# Patient Record
Sex: MALE | Race: WHITE | NOT HISPANIC OR LATINO | Employment: FULL TIME | URBAN - METROPOLITAN AREA
[De-identification: names, ages, dates, MRNs, and addresses within clinical notes are randomized per-mention and may not be internally consistent; named-entity substitution may affect disease eponyms.]

---

## 2022-01-03 PROCEDURE — U0004 COV-19 TEST NON-CDC HGH THRU: HCPCS | Performed by: PHYSICIAN ASSISTANT

## 2022-01-06 ENCOUNTER — TELEPHONE (OUTPATIENT)
Dept: URGENT CARE | Facility: CLINIC | Age: 57
End: 2022-01-06

## 2022-01-06 NOTE — TELEPHONE ENCOUNTER
Discussed positive COVID results with patient and/or parent/guardian. Advised to quarantine according to CDC guidelines and to seek further care if symptoms are worsening. Patient vocalized understanding and agreement with this plan.

## 2022-03-25 VITALS
DIASTOLIC BLOOD PRESSURE: 67 MMHG | HEIGHT: 73 IN | BODY MASS INDEX: 21.65 KG/M2 | WEIGHT: 163.36 LBS | TEMPERATURE: 97.6 F | SYSTOLIC BLOOD PRESSURE: 119 MMHG | RESPIRATION RATE: 18 BRPM | OXYGEN SATURATION: 100 % | HEART RATE: 74 BPM

## 2022-03-25 LAB
ALBUMIN SERPL-MCNC: 4.2 G/DL (ref 3.5–5.2)
ALBUMIN/GLOB SERPL: 1.8 G/DL
ALP SERPL-CCNC: 61 U/L (ref 39–117)
ALT SERPL W P-5'-P-CCNC: 17 U/L (ref 1–41)
ANION GAP SERPL CALCULATED.3IONS-SCNC: 10.4 MMOL/L (ref 5–15)
AST SERPL-CCNC: 24 U/L (ref 1–40)
BASOPHILS # BLD AUTO: 0.02 10*3/MM3 (ref 0–0.2)
BASOPHILS NFR BLD AUTO: 0.2 % (ref 0–1.5)
BILIRUB SERPL-MCNC: 0.5 MG/DL (ref 0–1.2)
BUN SERPL-MCNC: 19 MG/DL (ref 6–20)
BUN/CREAT SERPL: 21.1 (ref 7–25)
CALCIUM SPEC-SCNC: 9.4 MG/DL (ref 8.6–10.5)
CHLORIDE SERPL-SCNC: 97 MMOL/L (ref 98–107)
CO2 SERPL-SCNC: 30.6 MMOL/L (ref 22–29)
CREAT SERPL-MCNC: 0.9 MG/DL (ref 0.76–1.27)
DEPRECATED RDW RBC AUTO: 38.1 FL (ref 37–54)
EGFRCR SERPLBLD CKD-EPI 2021: 100.9 ML/MIN/1.73
EOSINOPHIL # BLD AUTO: 0.15 10*3/MM3 (ref 0–0.4)
EOSINOPHIL NFR BLD AUTO: 1.9 % (ref 0.3–6.2)
ERYTHROCYTE [DISTWIDTH] IN BLOOD BY AUTOMATED COUNT: 11.9 % (ref 12.3–15.4)
GLOBULIN UR ELPH-MCNC: 2.3 GM/DL
GLUCOSE SERPL-MCNC: 229 MG/DL (ref 65–99)
HCT VFR BLD AUTO: 35.8 % (ref 37.5–51)
HGB BLD-MCNC: 12.6 G/DL (ref 13–17.7)
HOLD SPECIMEN: NORMAL
HOLD SPECIMEN: NORMAL
IMM GRANULOCYTES # BLD AUTO: 0.02 10*3/MM3 (ref 0–0.05)
IMM GRANULOCYTES NFR BLD AUTO: 0.2 % (ref 0–0.5)
LYMPHOCYTES # BLD AUTO: 1.53 10*3/MM3 (ref 0.7–3.1)
LYMPHOCYTES NFR BLD AUTO: 19 % (ref 19.6–45.3)
MCH RBC QN AUTO: 30.6 PG (ref 26.6–33)
MCHC RBC AUTO-ENTMCNC: 35.2 G/DL (ref 31.5–35.7)
MCV RBC AUTO: 86.9 FL (ref 79–97)
MONOCYTES # BLD AUTO: 0.65 10*3/MM3 (ref 0.1–0.9)
MONOCYTES NFR BLD AUTO: 8.1 % (ref 5–12)
NEUTROPHILS NFR BLD AUTO: 5.67 10*3/MM3 (ref 1.7–7)
NEUTROPHILS NFR BLD AUTO: 70.6 % (ref 42.7–76)
NRBC BLD AUTO-RTO: 0 /100 WBC (ref 0–0.2)
PLATELET # BLD AUTO: 225 10*3/MM3 (ref 140–450)
PMV BLD AUTO: 8.9 FL (ref 6–12)
POTASSIUM SERPL-SCNC: 4.1 MMOL/L (ref 3.5–5.2)
PROT SERPL-MCNC: 6.5 G/DL (ref 6–8.5)
RBC # BLD AUTO: 4.12 10*6/MM3 (ref 4.14–5.8)
SODIUM SERPL-SCNC: 138 MMOL/L (ref 136–145)
WBC NRBC COR # BLD: 8.04 10*3/MM3 (ref 3.4–10.8)
WHOLE BLOOD HOLD SPECIMEN: NORMAL
WHOLE BLOOD HOLD SPECIMEN: NORMAL

## 2022-03-25 PROCEDURE — 99283 EMERGENCY DEPT VISIT LOW MDM: CPT

## 2022-03-25 PROCEDURE — 80053 COMPREHEN METABOLIC PANEL: CPT | Performed by: EMERGENCY MEDICINE

## 2022-03-25 PROCEDURE — 85025 COMPLETE CBC W/AUTO DIFF WBC: CPT | Performed by: EMERGENCY MEDICINE

## 2022-03-25 PROCEDURE — 36415 COLL VENOUS BLD VENIPUNCTURE: CPT | Performed by: EMERGENCY MEDICINE

## 2022-03-26 ENCOUNTER — HOSPITAL ENCOUNTER (EMERGENCY)
Facility: HOSPITAL | Age: 57
Discharge: HOME | End: 2022-03-26
Attending: EMERGENCY MEDICINE | Admitting: EMERGENCY MEDICINE

## 2022-03-26 DIAGNOSIS — K61.0 PERIANAL ABSCESS: Primary | ICD-10-CM

## 2022-03-26 RX ORDER — DOCUSATE SODIUM 100 MG/1
100 CAPSULE, LIQUID FILLED ORAL 2 TIMES DAILY
Qty: 14 CAPSULE | Refills: 0 | Status: SHIPPED | OUTPATIENT
Start: 2022-03-26

## 2022-03-26 RX ORDER — AMOXICILLIN AND CLAVULANATE POTASSIUM 875; 125 MG/1; MG/1
1 TABLET, FILM COATED ORAL ONCE
Status: COMPLETED | OUTPATIENT
Start: 2022-03-26 | End: 2022-03-26

## 2022-03-26 RX ORDER — LIDOCAINE HYDROCHLORIDE AND EPINEPHRINE 10; 10 MG/ML; UG/ML
10 INJECTION, SOLUTION INFILTRATION; PERINEURAL ONCE
Status: COMPLETED | OUTPATIENT
Start: 2022-03-26 | End: 2022-03-26

## 2022-03-26 RX ORDER — AMOXICILLIN AND CLAVULANATE POTASSIUM 875; 125 MG/1; MG/1
1 TABLET, FILM COATED ORAL 2 TIMES DAILY
Qty: 14 TABLET | Refills: 0 | Status: SHIPPED | OUTPATIENT
Start: 2022-03-26

## 2022-03-26 RX ADMIN — LIDOCAINE HYDROCHLORIDE,EPINEPHRINE BITARTRATE 10 ML: 10; .01 INJECTION, SOLUTION INFILTRATION; PERINEURAL at 01:25

## 2022-03-26 RX ADMIN — AMOXICILLIN AND CLAVULANATE POTASSIUM 1 TABLET: 875; 125 TABLET, FILM COATED ORAL at 01:46

## 2022-03-26 NOTE — ED PROVIDER NOTES
"Time: 12:59 AM EDT  Arrived by: Private car  Chief Complaint: Rectal pain    History of Present Illness:    Houston Witt is a 56 y.o. male who presents to the emergency department today with complaints of severe rectal pain. The patient reports that six days ago, he developed food poisoning after eating mac n cheese. He states that he was unable to eat or drink for four days. Two days ago, he advises that he was \"back to normal\" and resumed his typical diet.    However, today the patient reports that he attempted to have a bowel movement and \"pushed too hard.\" He has been having intermittent rectal pain since that time with associated blood in his stool. His pain worsens with sitting down.  The patient notes that he is able to have bowel movements, but that his stool is hard and will occasionally become stuck. He denies trying anything at home for his symptoms aside from applying ice cubes to reduce swelling.    The patient advises that he has a history of similar symptoms associated with hemorrhoids and believes this is the source of his bleeding and discomfort. He denies smoking cigarettes, drinking alcohol, or illicit drug use. There are no other acute complaints at this time.       History provided by:  Patient   used: No    Rectal Pain  Location:  Rectum  Quality:  Pain and bleeding  Severity:  Severe  Onset quality:  Sudden  Duration:  1 day  Timing:  Intermittent  Progression:  Unchanged  Chronicity:  New  Context:  Patient recently recovered from food poisoning. He states he strained too hard having a bowel movement and developed rectal pain and bleeding. He has a prior history of hemorrhoids.  Relieved by:  Sitting  Worsened by:  Standing  Ineffective treatments:  Ice  Associated symptoms: no chest pain, no cough, no fever, no nausea (resolved), no rash, no shortness of breath and no vomiting (resolved)    Risk factors:  History of hemorrhoids.      Similar Symptoms Previously: Yes; " with hemorrhoids.  Recently seen: Patient was seen in urgent care on 3/22/2022 with nausea and vomiting.      Patient Care Team  Primary Care Provider: Provider, No Known    Past Medical History:     No Known Allergies  Past Medical History:   Diagnosis Date   • Bronchitis      Past Surgical History:   Procedure Laterality Date   • APPENDECTOMY       History reviewed. No pertinent family history.    Home Medications:  Prior to Admission medications    Medication Sig Start Date End Date Taking? Authorizing Provider   dicyclomine (BENTYL) 20 MG tablet Take 1 tablet by mouth Every 6 (Six) Hours As Needed (Abdominal cramps). 3/22/22   Sen Dillon PA   ondansetron ODT (ZOFRAN-ODT) 4 MG disintegrating tablet Place 1 tablet on the tongue Every 8 (Eight) Hours As Needed for Nausea or Vomiting. 3/22/22   Sen Dillon PA        Social History:   Social History     Tobacco Use   • Smoking status: Never Smoker   • Smokeless tobacco: Never Used   Vaping Use   • Vaping Use: Never used   Substance Use Topics   • Alcohol use: Never     Comment: IN RECOVER 22 YEARS   • Drug use: Never       Record Review:  I have reviewed the patient's records in [facility].     Review of Systems:  Review of Systems   Constitutional: Negative for appetite change (resolved), chills and fever.   HENT: Negative for nosebleeds.    Eyes: Negative for redness.   Respiratory: Negative for cough and shortness of breath.    Cardiovascular: Negative for chest pain.   Gastrointestinal: Positive for blood in stool, constipation (hard stools) and rectal pain. Negative for nausea (resolved) and vomiting (resolved).   Genitourinary: Negative for dysuria and frequency.   Musculoskeletal: Negative for back pain and neck pain.   Skin: Negative for rash.   Neurological: Negative for seizures.   All other systems reviewed and are negative.       Physical Exam:  /67 (BP Location: Left arm, Patient Position: Sitting)   Pulse 74   Temp 97.6 °F (36.4 °C)  "(Oral)   Resp 18   Ht 185.4 cm (73\")   Wt 74.1 kg (163 lb 5.8 oz)   SpO2 100%   BMI 21.55 kg/m²     Physical Exam  Vitals and nursing note reviewed.   Constitutional:       General: He is not in acute distress.  HENT:      Head: Normocephalic and atraumatic.      Nose: Nose normal.      Mouth/Throat:      Mouth: Mucous membranes are moist.   Eyes:      General: No scleral icterus.  Cardiovascular:      Rate and Rhythm: Normal rate and regular rhythm.      Heart sounds: Normal heart sounds. No murmur heard.  Pulmonary:      Effort: No respiratory distress.      Breath sounds: Normal breath sounds.   Abdominal:      Palpations: Abdomen is soft.      Tenderness: There is no abdominal tenderness.   Genitourinary:     Comments: 2cm elongated mass at his anus that is severely tender to palpation and appears to be fluctuant. It has cloudy to clear fluid.  Musculoskeletal:         General: No tenderness. Normal range of motion.      Cervical back: Normal range of motion and neck supple.      Right lower leg: No edema.      Left lower leg: No edema.   Skin:     General: Skin is warm and dry.   Neurological:      Mental Status: He is alert. Mental status is at baseline.   Psychiatric:         Behavior: Behavior normal.                Medications in the Emergency Department:  Medications   amoxicillin-clavulanate (AUGMENTIN) 875-125 MG per tablet 1 tablet (has no administration in time range)   lidocaine 1% - EPINEPHrine 1:796159 (XYLOCAINE W/EPI) 1 %-1:350849 injection 10 mL (10 mL Injection Given 3/26/22 0125)        Labs  Lab Results (last 24 hours)     Procedure Component Value Units Date/Time    CBC & Differential [[id number]]  (Abnormal) Collected: 03/25/22 2259    Specimen: Blood Updated: 03/25/22 2314    Narrative:      The following orders were created for panel order CBC & Differential.  Procedure                               Abnormality         Status                     ---------                               " -----------         ------                     CBC Auto Differential[[id number]]        Abnormal            Final result                 Please view results for these tests on the individual orders.    Comprehensive Metabolic Panel [[id number]]  (Abnormal) Collected: 03/25/22 2259    Specimen: Blood Updated: 03/25/22 2332     Glucose 229 mg/dL      BUN 19 mg/dL      Creatinine 0.90 mg/dL      Sodium 138 mmol/L      Potassium 4.1 mmol/L      Chloride 97 mmol/L      CO2 30.6 mmol/L      Calcium 9.4 mg/dL      Total Protein 6.5 g/dL      Albumin 4.20 g/dL      ALT (SGPT) 17 U/L      AST (SGOT) 24 U/L      Alkaline Phosphatase 61 U/L      Total Bilirubin 0.5 mg/dL      Globulin 2.3 gm/dL      A/G Ratio 1.8 g/dL      BUN/Creatinine Ratio 21.1     Anion Gap 10.4 mmol/L      eGFR 100.9 mL/min/1.73      Comment: National Kidney Foundation and American Society of Nephrology (ASN) Task Force recommended calculation based on the Chronic Kidney Disease Epidemiology Collaboration (CKD-EPI) equation refit without adjustment for race.       Narrative:      GFR Normal >60  Chronic Kidney Disease <60  Kidney Failure <15      CBC Auto Differential [[id number]]  (Abnormal) Collected: 03/25/22 2259    Specimen: Blood Updated: 03/25/22 2314     WBC 8.04 10*3/mm3      RBC 4.12 10*6/mm3      Hemoglobin 12.6 g/dL      Hematocrit 35.8 %      MCV 86.9 fL      MCH 30.6 pg      MCHC 35.2 g/dL      RDW 11.9 %      RDW-SD 38.1 fl      MPV 8.9 fL      Platelets 225 10*3/mm3      Neutrophil % 70.6 %      Lymphocyte % 19.0 %      Monocyte % 8.1 %      Eosinophil % 1.9 %      Basophil % 0.2 %      Immature Grans % 0.2 %      Neutrophils, Absolute 5.67 10*3/mm3      Lymphocytes, Absolute 1.53 10*3/mm3      Monocytes, Absolute 0.65 10*3/mm3      Eosinophils, Absolute 0.15 10*3/mm3      Basophils, Absolute 0.02 10*3/mm3      Immature Grans, Absolute 0.02 10*3/mm3      nRBC 0.0 /100 WBC            Imaging:  No Radiology Exams Resulted Within Past 24  Hours    Procedures:  Incision & Drainage    Date/Time: 3/26/2022 1:42 AM  Performed by: Pedrito Stokes MD  Authorized by: Pedrito Stokes MD     Consent:     Consent obtained:  Verbal    Consent given by:  Patient    Risks, benefits, and alternatives were discussed: yes      Risks discussed:  Bleeding, incomplete drainage, infection and pain    Alternatives discussed:  No treatment and delayed treatment  Universal protocol:     Patient identity confirmed:  Verbally with patient  Location:     Type:  Abscess    Location:  Anogenital    Anogenital location:  Perianal  Pre-procedure details:     Skin preparation:  Povidone-iodine  Sedation:     Sedation type:  None  Anesthesia:     Anesthesia method:  Local infiltration    Local anesthetic:  Lidocaine 1% WITH epi  Procedure type:     Complexity:  Simple  Procedure details:     Needle aspiration: yes      Needle size:  18 G    Incision types:  Single straight    Incision depth:  Subcutaneous    Drainage:  Bloody and purulent    Drainage amount:  Scant    Wound treatment:  Wound left open  Post-procedure details:     Procedure completion:  Tolerated well, no immediate complications        Progress                            Medical Decision Making:  MDM     With perirectal fluctuant mass.  Initially anesthetized and aspirated with 18-gauge needle.  Needle returned cloudy bloody fluid.  A longitudinal incision was made with again cloudy fluid mixed with blood drained.  Mass appears to have edematous tissue.  Encourage patient to take antibiotics utilize sitz bath's and follow-up with general surgery for further evaluation.  We discussed return precautions including worsening symptoms or any additional concerns.    Final diagnoses:   Perianal abscess        Disposition:  ED Disposition     ED Disposition   Discharge    Condition   Stable    Comment   --             Dictated Utilizing Dragon Dictation    Documentation assistance provided by Bety Urban acting as scribe  for Pedrito tSokes MD. Information recorded by the scribe was done at my direction and has been verified and validated by me.      Bety Urban  03/26/22 0107       Bety Urban  03/26/22 0115       Pedrito Stokes MD  03/26/22 0717

## 2022-04-01 ENCOUNTER — OFFICE VISIT (OUTPATIENT)
Dept: SURGERY | Facility: CLINIC | Age: 57
End: 2022-04-01

## 2022-04-01 VITALS — BODY MASS INDEX: 22.03 KG/M2 | HEIGHT: 73 IN | WEIGHT: 166.2 LBS | RESPIRATION RATE: 16 BRPM

## 2022-04-01 DIAGNOSIS — K64.5 THROMBOSED EXTERNAL HEMORRHOID: Primary | ICD-10-CM

## 2022-04-01 PROCEDURE — 46083 INC THROMBOSED HROID XTRNL: CPT | Performed by: SURGERY

## 2022-04-01 RX ORDER — LIDOCAINE 50 MG/G
1 OINTMENT TOPICAL
Qty: 30 G | Refills: 1 | Status: SHIPPED | OUTPATIENT
Start: 2022-04-01

## 2022-04-01 RX ORDER — PRAMOXINE HYDROCHLORIDE 10 MG/G
AEROSOL, FOAM TOPICAL EVERY 4 HOURS PRN
Qty: 15 G | Refills: 1 | Status: SHIPPED | OUTPATIENT
Start: 2022-04-01 | End: 2022-04-15

## 2022-04-01 RX ORDER — HYDROCODONE BITARTRATE AND ACETAMINOPHEN 5; 325 MG/1; MG/1
1-2 TABLET ORAL EVERY 4 HOURS PRN
Qty: 15 TABLET | Refills: 0 | Status: SHIPPED | OUTPATIENT
Start: 2022-04-01

## 2022-04-01 NOTE — PROGRESS NOTES
Preoperative diagnosis:  Thrombosed external hemorrhoid     Postoperative diagnosis:   Thrombosed external hemorrhoid     Procedure:  Incision and drainage of thrombosed external hemorrhoid     Surgeon:  Mukul Jones M.D.     Anesthesia:  2 ml  Lidocaine     Assistant:  Nursing staff     EBL:  Minimal     Complications:  None      Indications: Patient is a 56 y.o. male referred by Pedrito Stokes MD the patient went to the emergency room with pain at the right side of his anus.  He says he was told he had a hemorrhoid.  He was told to follow-up with me.  Patient still has significant pain at his anus.  On exam, patient has a thrombosed hemorrhoid at the right posterior anus.  There is a small amount of skin necrosis over the hemorrhoid and the hemorrhoid is tender to light palpation.  Decision was made to proceed with incision and drainage of the thrombosed external hemorrhoid.  Risk and benefits were discussed with the patient.    Findings:  Thrombosed hemorrhoid at the right posterior anus.  Hemorrhoid was incised and drained.     Technique: Patient was taken to the procedure room and placed appropriately On the procedure table.  Consent had already been obtained.    Patient was positioned with his right shoulder down on the exam table.  The thrombosed hemorrhoid was cleaned appropriately and then 2 mL of lidocaine was injected into the skin over the hemorrhoid.  An 11 blade knife was used to fashion an incision directly over the hemorrhoid and I evacuated some old clotted blood.  Adequate hemostasis.  Patient did well during the procedure. No complications.  Gauze dressing was placed.        Follow-up:  Wound care instructions were provided verbally.  Hemorrhoid medicines and pain medicine prescribed  Patient will follow-up with me PRN.    Mukul Jones MD  04/01/2022    Electronically signed by Mukul Jones MD, 04/01/22, 2:51 PM EDT.

## 2023-10-03 ENCOUNTER — OFFICE VISIT (OUTPATIENT)
Dept: INTERNAL MEDICINE | Facility: CLINIC | Age: 58
End: 2023-10-03
Payer: COMMERCIAL

## 2023-10-03 VITALS
OXYGEN SATURATION: 96 % | HEIGHT: 73 IN | TEMPERATURE: 97.2 F | DIASTOLIC BLOOD PRESSURE: 80 MMHG | SYSTOLIC BLOOD PRESSURE: 139 MMHG | BODY MASS INDEX: 21.52 KG/M2 | WEIGHT: 162.4 LBS | HEART RATE: 81 BPM

## 2023-10-03 DIAGNOSIS — Z12.5 SCREENING FOR MALIGNANT NEOPLASM OF PROSTATE: ICD-10-CM

## 2023-10-03 DIAGNOSIS — E78.5 HYPERLIPIDEMIA, UNSPECIFIED HYPERLIPIDEMIA TYPE: ICD-10-CM

## 2023-10-03 DIAGNOSIS — Z12.11 ENCOUNTER FOR SCREENING FOR MALIGNANT NEOPLASM OF COLON: ICD-10-CM

## 2023-10-03 DIAGNOSIS — E11.65 TYPE 2 DIABETES MELLITUS WITH HYPERGLYCEMIA, WITHOUT LONG-TERM CURRENT USE OF INSULIN: Chronic | ICD-10-CM

## 2023-10-03 DIAGNOSIS — Z11.59 NEED FOR HEPATITIS C SCREENING TEST: ICD-10-CM

## 2023-10-03 DIAGNOSIS — Z00.00 ANNUAL PHYSICAL EXAM: Primary | ICD-10-CM

## 2023-10-03 PROBLEM — K70.30 ALCOHOLIC CIRRHOSIS: Status: ACTIVE | Noted: 2023-10-03

## 2023-10-03 PROBLEM — F10.21 RECOVERING ALCOHOLIC IN REMISSION: Status: ACTIVE | Noted: 2023-10-03

## 2023-10-03 LAB
ALBUMIN SERPL-MCNC: 5 G/DL (ref 3.5–5.2)
ALBUMIN UR-MCNC: <1.2 MG/DL
ALBUMIN/GLOB SERPL: 2 G/DL
ALP SERPL-CCNC: 63 U/L (ref 39–117)
ALT SERPL W P-5'-P-CCNC: 15 U/L (ref 1–41)
ANION GAP SERPL CALCULATED.3IONS-SCNC: 7.8 MMOL/L (ref 5–15)
AST SERPL-CCNC: 17 U/L (ref 1–40)
BASOPHILS # BLD AUTO: 0.03 10*3/MM3 (ref 0–0.2)
BASOPHILS NFR BLD AUTO: 0.5 % (ref 0–1.5)
BILIRUB SERPL-MCNC: 0.5 MG/DL (ref 0–1.2)
BUN SERPL-MCNC: 18 MG/DL (ref 6–20)
BUN/CREAT SERPL: 22.2 (ref 7–25)
CALCIUM SPEC-SCNC: 10.2 MG/DL (ref 8.6–10.5)
CHLORIDE SERPL-SCNC: 100 MMOL/L (ref 98–107)
CHOLEST SERPL-MCNC: 163 MG/DL (ref 0–200)
CO2 SERPL-SCNC: 29.2 MMOL/L (ref 22–29)
CREAT SERPL-MCNC: 0.81 MG/DL (ref 0.76–1.27)
CREAT UR-MCNC: 33.2 MG/DL
DEPRECATED RDW RBC AUTO: 43 FL (ref 37–54)
EGFRCR SERPLBLD CKD-EPI 2021: 103.5 ML/MIN/1.73
EOSINOPHIL # BLD AUTO: 0.07 10*3/MM3 (ref 0–0.4)
EOSINOPHIL NFR BLD AUTO: 1.2 % (ref 0.3–6.2)
ERYTHROCYTE [DISTWIDTH] IN BLOOD BY AUTOMATED COUNT: 13.1 % (ref 12.3–15.4)
GLOBULIN UR ELPH-MCNC: 2.5 GM/DL
GLUCOSE SERPL-MCNC: 191 MG/DL (ref 65–99)
HBA1C MFR BLD: 7.9 % (ref 4.8–5.6)
HCT VFR BLD AUTO: 43.9 % (ref 37.5–51)
HCV AB SER DONR QL: NORMAL
HDLC SERPL-MCNC: 48 MG/DL (ref 40–60)
HGB BLD-MCNC: 15.1 G/DL (ref 13–17.7)
IMM GRANULOCYTES # BLD AUTO: 0.01 10*3/MM3 (ref 0–0.05)
IMM GRANULOCYTES NFR BLD AUTO: 0.2 % (ref 0–0.5)
LDLC SERPL CALC-MCNC: 104 MG/DL (ref 0–100)
LDLC/HDLC SERPL: 2.16 {RATIO}
LYMPHOCYTES # BLD AUTO: 1.23 10*3/MM3 (ref 0.7–3.1)
LYMPHOCYTES NFR BLD AUTO: 20.8 % (ref 19.6–45.3)
MCH RBC QN AUTO: 30.9 PG (ref 26.6–33)
MCHC RBC AUTO-ENTMCNC: 34.4 G/DL (ref 31.5–35.7)
MCV RBC AUTO: 89.8 FL (ref 79–97)
MICROALBUMIN/CREAT UR: NORMAL MG/G{CREAT}
MONOCYTES # BLD AUTO: 0.41 10*3/MM3 (ref 0.1–0.9)
MONOCYTES NFR BLD AUTO: 6.9 % (ref 5–12)
NEUTROPHILS NFR BLD AUTO: 4.15 10*3/MM3 (ref 1.7–7)
NEUTROPHILS NFR BLD AUTO: 70.4 % (ref 42.7–76)
NRBC BLD AUTO-RTO: 0 /100 WBC (ref 0–0.2)
PLATELET # BLD AUTO: 317 10*3/MM3 (ref 140–450)
PMV BLD AUTO: 8.9 FL (ref 6–12)
POTASSIUM SERPL-SCNC: 5.3 MMOL/L (ref 3.5–5.2)
PROT SERPL-MCNC: 7.5 G/DL (ref 6–8.5)
PSA SERPL-MCNC: 0.28 NG/ML (ref 0–4)
RBC # BLD AUTO: 4.89 10*6/MM3 (ref 4.14–5.8)
SODIUM SERPL-SCNC: 137 MMOL/L (ref 136–145)
TRIGL SERPL-MCNC: 57 MG/DL (ref 0–150)
TSH SERPL DL<=0.05 MIU/L-ACNC: 4.25 UIU/ML (ref 0.27–4.2)
VLDLC SERPL-MCNC: 11 MG/DL (ref 5–40)
WBC NRBC COR # BLD: 5.9 10*3/MM3 (ref 3.4–10.8)

## 2023-10-03 PROCEDURE — 83036 HEMOGLOBIN GLYCOSYLATED A1C: CPT | Performed by: NURSE PRACTITIONER

## 2023-10-03 PROCEDURE — 86803 HEPATITIS C AB TEST: CPT | Performed by: NURSE PRACTITIONER

## 2023-10-03 PROCEDURE — 80061 LIPID PANEL: CPT | Performed by: NURSE PRACTITIONER

## 2023-10-03 PROCEDURE — 80050 GENERAL HEALTH PANEL: CPT | Performed by: NURSE PRACTITIONER

## 2023-10-03 PROCEDURE — 82570 ASSAY OF URINE CREATININE: CPT | Performed by: NURSE PRACTITIONER

## 2023-10-03 PROCEDURE — G0103 PSA SCREENING: HCPCS | Performed by: NURSE PRACTITIONER

## 2023-10-03 PROCEDURE — 82043 UR ALBUMIN QUANTITATIVE: CPT | Performed by: NURSE PRACTITIONER

## 2023-10-03 NOTE — PROGRESS NOTES
"Chief Complaint  Establish Care (New patient. The patient was in the hospital recently for dizziness. His sugar was checked and it was 500. He was put on metformin and he is taking 1000mg and he states he was dizzy, nauseous. He states he reduced the amount of 500mg and is doing better. )  Subjective      History of Present Illness  Houston Witt is a 57 y.o. male who presents to [facility]     1.  To establish primary care. No recent PCP.     2.  Follow-up uncontrolled diabetes. He has had health insurance for 2 weeks. He has been off metformin for 3 years. Recently he was found to have a blood sugar of 500 and restarted metformin 30 days ago. Yesterday blood sugar was 155 non fasting. He has also changed his diet. 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     Past Medical History:   Diagnosis Date    Bronchitis     Diabetes mellitus     Hyperlipidemia 10/03/2023    Recovering alcoholic in remission 10/03/2023    Type 2 diabetes mellitus with hyperglycemia, without long-term current use of insulin 10/03/2023        Past Surgical History:   Procedure Laterality Date    APPENDECTOMY          No Known Allergies       Current Outpatient Medications:     metFORMIN (GLUCOPHAGE) 500 MG tablet, Take 1 tablet by mouth 2 (Two) Times a Day With Meals., Disp: 180 tablet, Rfl: 1    Objective   /80 (BP Location: Right arm, Patient Position: Sitting, Cuff Size: Adult)   Pulse 81   Temp 97.2 °F (36.2 °C) (Temporal)   Ht 185.4 cm (73\")   Wt 73.7 kg (162 lb 6.4 oz)   SpO2 96%   BMI 21.43 kg/m²    Estimated body mass index is 21.43 kg/m² as calculated from the following:    Height as of this encounter: 185.4 cm (73\").    Weight as of this encounter: 73.7 kg (162 lb 6.4 oz).   Physical Exam  Vitals reviewed.   Constitutional:       General: He is not in acute distress.  HENT:      Head: Normocephalic and atraumatic.   Eyes:      Conjunctiva/sclera: Conjunctivae normal.   Neck: "      Comments: No thyroid enlargement  Cardiovascular:      Rate and Rhythm: Normal rate and regular rhythm.   Pulmonary:      Effort: Pulmonary effort is normal.      Breath sounds: Normal breath sounds. No wheezing, rhonchi or rales.   Musculoskeletal:      Right lower leg: No edema.      Left lower leg: No edema.   Lymphadenopathy:      Cervical: No cervical adenopathy.   Skin:     General: Skin is warm and dry.   Neurological:      General: No focal deficit present.      Mental Status: He is alert.   Psychiatric:         Thought Content: Thought content normal.               Assessment and Plan   Diagnoses and all orders for this visit:    1. Annual physical exam (Primary)  Comments:  Discussed healthy diet, exercise, adequate sleep, cancer screening, immunizations, and preventative care.  Annual eye exam and twice yearly dental cleaning.  Orders:  -     Comprehensive Metabolic Panel  -     CBC & Differential  -     Lipid Panel  -     TSH    2. Type 2 diabetes mellitus with hyperglycemia, without long-term current use of insulin  Comments:  Check HA1C today. Continue metformin 500 mg BID. Recheck in 3 months.  Orders:  -     Cancel: Comprehensive Metabolic Panel  -     Hemoglobin A1c  -     Microalbumin / Creatinine Urine Ratio - Urine, Clean Catch  -     Ambulatory Referral to Podiatry    3. Hyperlipidemia, unspecified hyperlipidemia type  -     Cancel: Lipid Panel    4. Need for hepatitis C screening test  -     Hepatitis C Antibody    5. Screening for malignant neoplasm of prostate  -     PSA Screen    6. Encounter for screening for malignant neoplasm of colon  -     Ambulatory Referral For Screening Colonoscopy    Other orders  -     metFORMIN (GLUCOPHAGE) 500 MG tablet; Take 1 tablet by mouth 2 (Two) Times a Day With Meals.  Dispense: 180 tablet; Refill: 1      BMI is within normal parameters. No other follow-up for BMI required.       Patient was given instructions and counseling regarding his condition.  Please see specific information pulled into the AVS if appropriate.     Follow Up   Return in about 3 months (around 1/3/2024) for Recheck.    Dictated Utilizing Dragon Dictation.  Please note that portions of this note were completed with a voice recognition program.  Part of this note may be an electronic transcription/translation of spoken language to printed text using the Dragon Dictation System.    SHANNON Hernandez

## 2023-10-05 ENCOUNTER — TELEPHONE (OUTPATIENT)
Dept: INTERNAL MEDICINE | Facility: CLINIC | Age: 58
End: 2023-10-05

## 2023-10-05 NOTE — TELEPHONE ENCOUNTER
Caller: Houston Witt    Relationship: Self    Best call back number: [phone]    What medications are you currently taking:   Current Outpatient Medications on File Prior to Visit   Medication Sig Dispense Refill    metFORMIN (GLUCOPHAGE) 500 MG tablet Take 1 tablet by mouth 2 (Two) Times a Day With Meals. 180 tablet 1     No current facility-administered medications on file prior to visit.          What are your concerns: PATIENT IS CALLING REGARDING THE Isabella Oliver MESSAGE HE LEFT.

## 2023-10-06 RX ORDER — ONDANSETRON 4 MG/1
4 TABLET, ORALLY DISINTEGRATING ORAL EVERY 8 HOURS PRN
Qty: 90 TABLET | Refills: 1 | Status: SHIPPED | OUTPATIENT
Start: 2023-10-06

## 2023-10-06 NOTE — TELEPHONE ENCOUNTER
The patient is wanting zofran for nausea since starting back on the metformin. He state sthat he was given this medication when he first started the metformin. I have sent the medication.    Patient would also like lab results.

## 2023-10-10 DIAGNOSIS — R79.89 ABNORMAL TSH: ICD-10-CM

## 2023-10-10 DIAGNOSIS — E11.65 TYPE 2 DIABETES MELLITUS WITH HYPERGLYCEMIA, WITHOUT LONG-TERM CURRENT USE OF INSULIN: Primary | ICD-10-CM

## 2023-10-11 ENCOUNTER — OFFICE VISIT (OUTPATIENT)
Dept: SURGERY | Facility: CLINIC | Age: 58
End: 2023-10-11
Payer: COMMERCIAL

## 2023-10-11 ENCOUNTER — PREP FOR SURGERY (OUTPATIENT)
Dept: OTHER | Facility: HOSPITAL | Age: 58
End: 2023-10-11
Payer: COMMERCIAL

## 2023-10-11 VITALS
HEART RATE: 85 BPM | DIASTOLIC BLOOD PRESSURE: 81 MMHG | SYSTOLIC BLOOD PRESSURE: 136 MMHG | WEIGHT: 163.2 LBS | BODY MASS INDEX: 21.63 KG/M2 | HEIGHT: 73 IN

## 2023-10-11 DIAGNOSIS — Z80.0 FAMILY HISTORY OF COLON CANCER IN MOTHER: ICD-10-CM

## 2023-10-11 DIAGNOSIS — Z12.11 SCREENING FOR MALIGNANT NEOPLASM OF COLON: Primary | ICD-10-CM

## 2023-10-11 RX ORDER — SODIUM CHLORIDE 9 MG/ML
40 INJECTION, SOLUTION INTRAVENOUS AS NEEDED
OUTPATIENT
Start: 2023-10-11

## 2023-10-11 RX ORDER — SODIUM CHLORIDE 0.9 % (FLUSH) 0.9 %
10 SYRINGE (ML) INJECTION AS NEEDED
OUTPATIENT
Start: 2023-10-11

## 2023-10-11 RX ORDER — PEG-3350, SODIUM SULFATE, SODIUM CHLORIDE, POTASSIUM CHLORIDE, SODIUM ASCORBATE AND ASCORBIC ACID 7.5-2.691G
1000 KIT ORAL ONCE
Qty: 1000 ML | Refills: 0 | Status: SHIPPED | OUTPATIENT
Start: 2023-10-11 | End: 2023-10-11

## 2023-10-11 RX ORDER — SODIUM CHLORIDE 0.9 % (FLUSH) 0.9 %
3 SYRINGE (ML) INJECTION EVERY 12 HOURS SCHEDULED
OUTPATIENT
Start: 2023-10-11

## 2023-10-11 NOTE — PROGRESS NOTES
Chief Complaint: Colonoscopy    Subjective      Colonoscopy consultation       History of Present Illness  Houston Witt is a 57 y.o. male presents to [facility] for colonoscopy consultation.    Patient presents today on referral from Marianna Pardo for colonoscopy consultation.  Patient denies any abdominal pain, change in bowel habit, or rectal bleeding.  Admits to family history of colon cancer with his mother that recently passed.  No previous colonoscopy.    Objective     Past Medical History:   Diagnosis Date    Bronchitis     Diabetes mellitus     Hemorrhoids     Hyperlipidemia 10/03/2023    Recovering alcoholic in remission 10/03/2023    Type 2 diabetes mellitus with hyperglycemia, without long-term current use of insulin 10/03/2023       Past Surgical History:   Procedure Laterality Date    APPENDECTOMY         Outpatient Medications Marked as Taking for the 10/11/23 encounter (Office Visit) with Angeline Castaneda APRN   Medication Sig Dispense Refill    metFORMIN (GLUCOPHAGE) 500 MG tablet Take 1 tablet by mouth 2 (Two) Times a Day With Meals. 180 tablet 1    ondansetron ODT (ZOFRAN-ODT) 4 MG disintegrating tablet Place 1 tablet on the tongue Every 8 (Eight) Hours As Needed for Nausea or Vomiting. 90 tablet 1       No Known Allergies     Family History   Problem Relation Age of Onset    Colon cancer Mother        Social History     Socioeconomic History    Marital status:    Tobacco Use    Smoking status: Never     Passive exposure: Never    Smokeless tobacco: Never   Vaping Use    Vaping Use: Never used   Substance and Sexual Activity    Alcohol use: Never     Comment: IN RECOVER 22 YEARS    Drug use: Never    Sexual activity: Defer       Review of Systems   Constitutional:  Negative for chills and fever.   Gastrointestinal:  Negative for abdominal distention, abdominal pain, anal bleeding, blood in stool, constipation, diarrhea and rectal pain.        Vital  "Signs:   /81   Pulse 85   Ht 185.4 cm (73\")   Wt 74 kg (163 lb 3.2 oz)   BMI 21.53 kg/mý      Physical Exam  Vitals and nursing note reviewed.   Constitutional:       General: He is not in acute distress.     Appearance: Normal appearance.   HENT:      Head: Normocephalic.   Cardiovascular:      Rate and Rhythm: Normal rate.   Pulmonary:      Effort: Pulmonary effort is normal.   Abdominal:      Palpations: Abdomen is soft.      Tenderness: There is no guarding.   Musculoskeletal:         General: No deformity. Normal range of motion.   Skin:     General: Skin is warm and dry.      Coloration: Skin is not jaundiced.   Neurological:      General: No focal deficit present.      Mental Status: He is alert and oriented to person, place, and time.   Psychiatric:         Mood and Affect: Mood normal.         Thought Content: Thought content normal.          Result Review :          []  Laboratory  []  Radiology  []  Pathology  []  Microbiology  []  EKG/Telemetry   []  Cardiology/Vascular   []  Old records  I spent 15 minutes caring for Houston on this date of service. This time includes time spent by me in the following activities: reviewing tests, obtaining and/or reviewing a separately obtained history, performing a medically appropriate examination and/or evaluation, ordering medications, tests, or procedures, and documenting information in the medical record.       Assessment and Plan    Diagnoses and all orders for this visit:    1. Screening for malignant neoplasm of colon (Primary)    2. Family history of colon cancer in mother    Other orders  -     PEG-KCl-NaCl-NaSulf-Na Asc-C (MOVIPREP) 100 g reconstituted solution powder; Take 1,000 mL by mouth 1 (One) Time for 1 dose.  Dispense: 1000 mL; Refill: 0        Follow Up   Return for Scheduled colonoscopy with Dr. Jones on [facility]/6/2024 [facility].    Hospital arrival time: 0800.    Possible risks/complications, benefits, and alternatives to " surgical or invasive procedures have been explained to patient and/or legal guardian.    Patient has been evaluated and can tolerate anesthesia and/or sedation. Risks, benefits, and alternatives to anesthesia and sedation have been explained to the patient and/or legal guardian. Patient verbalizes understanding and is willing to proceed with the above plan.     Patient was given instructions and counseling regarding his condition or for health maintenance advice. Please see specific information pulled into the AVS if appropriate.

## 2023-10-23 ENCOUNTER — TELEPHONE (OUTPATIENT)
Dept: INTERNAL MEDICINE | Facility: CLINIC | Age: 58
End: 2023-10-23
Payer: COMMERCIAL

## 2023-10-23 NOTE — TELEPHONE ENCOUNTER
----- Message from Houston Witt sent at 10/23/2023  9:59 AM EDT -----  Regarding: LAB RESULTS  Contact: [phone]  I first would like to speak to my doctor Marianna or a nurse before I go see someone, please call me @ [phone]

## 2023-10-23 NOTE — TELEPHONE ENCOUNTER
Patient is experiencing constipation and rectal bleeding from pressure of trying to have a bowel movement.  When he does have a bowel movement it is extremely large and hard and feels it is ripping, causing the bleeding.  He is drinking a lot of water but still not helping much.  Taking 3 tabs a day of OTC stool softner, with a little bit of movement.  Is there a prescription stool softner that is a little stronger that he can try so that he does not experience this any further?  Please advise....

## 2023-10-24 NOTE — TELEPHONE ENCOUNTER
Spoke with patient and informed.  Advised that he could also try Miralax, Dulcox or Colace-all of these options are good OTC.  Patient voiced understanding

## 2023-10-26 ENCOUNTER — TELEPHONE (OUTPATIENT)
Dept: INTERNAL MEDICINE | Facility: CLINIC | Age: 58
End: 2023-10-26
Payer: COMMERCIAL

## 2024-01-03 ENCOUNTER — OFFICE VISIT (OUTPATIENT)
Dept: INTERNAL MEDICINE | Facility: CLINIC | Age: 59
End: 2024-01-03
Payer: COMMERCIAL

## 2024-01-03 VITALS
OXYGEN SATURATION: 99 % | SYSTOLIC BLOOD PRESSURE: 126 MMHG | DIASTOLIC BLOOD PRESSURE: 90 MMHG | TEMPERATURE: 97 F | WEIGHT: 195.6 LBS | HEART RATE: 76 BPM | HEIGHT: 73 IN | BODY MASS INDEX: 25.92 KG/M2

## 2024-01-03 DIAGNOSIS — R79.89 ABNORMAL TSH: ICD-10-CM

## 2024-01-03 DIAGNOSIS — E11.65 TYPE 2 DIABETES MELLITUS WITH HYPERGLYCEMIA, WITHOUT LONG-TERM CURRENT USE OF INSULIN: Primary | ICD-10-CM

## 2024-01-03 DIAGNOSIS — S60.552S FOREIGN BODY OF LEFT HAND, SEQUELA: ICD-10-CM

## 2024-01-03 DIAGNOSIS — E78.5 HYPERLIPIDEMIA, UNSPECIFIED HYPERLIPIDEMIA TYPE: Chronic | ICD-10-CM

## 2024-01-03 DIAGNOSIS — Z30.09 VISIT FOR VASECTOMY EVALUATION: ICD-10-CM

## 2024-01-03 LAB
ALBUMIN SERPL-MCNC: 4.6 G/DL (ref 3.5–5.2)
ALBUMIN/GLOB SERPL: 2.1 G/DL
ALP SERPL-CCNC: 64 U/L (ref 39–117)
ALT SERPL W P-5'-P-CCNC: 14 U/L (ref 1–41)
ANION GAP SERPL CALCULATED.3IONS-SCNC: 9.8 MMOL/L (ref 5–15)
AST SERPL-CCNC: 18 U/L (ref 1–40)
BASOPHILS # BLD AUTO: 0.03 10*3/MM3 (ref 0–0.2)
BASOPHILS NFR BLD AUTO: 0.5 % (ref 0–1.5)
BILIRUB SERPL-MCNC: 0.4 MG/DL (ref 0–1.2)
BUN SERPL-MCNC: 20 MG/DL (ref 6–20)
BUN/CREAT SERPL: 21.5 (ref 7–25)
CALCIUM SPEC-SCNC: 9.9 MG/DL (ref 8.6–10.5)
CHLORIDE SERPL-SCNC: 101 MMOL/L (ref 98–107)
CHOLEST SERPL-MCNC: 122 MG/DL (ref 0–200)
CO2 SERPL-SCNC: 27.2 MMOL/L (ref 22–29)
CREAT SERPL-MCNC: 0.93 MG/DL (ref 0.76–1.27)
DEPRECATED RDW RBC AUTO: 40.6 FL (ref 37–54)
EGFRCR SERPLBLD CKD-EPI 2021: 95.8 ML/MIN/1.73
EOSINOPHIL # BLD AUTO: 0.11 10*3/MM3 (ref 0–0.4)
EOSINOPHIL NFR BLD AUTO: 1.8 % (ref 0.3–6.2)
ERYTHROCYTE [DISTWIDTH] IN BLOOD BY AUTOMATED COUNT: 12.6 % (ref 12.3–15.4)
GLOBULIN UR ELPH-MCNC: 2.2 GM/DL
GLUCOSE SERPL-MCNC: 143 MG/DL (ref 65–99)
HBA1C MFR BLD: 6.7 % (ref 4.8–5.6)
HCT VFR BLD AUTO: 38.5 % (ref 37.5–51)
HDLC SERPL-MCNC: 41 MG/DL (ref 40–60)
HGB BLD-MCNC: 13.8 G/DL (ref 13–17.7)
IMM GRANULOCYTES # BLD AUTO: 0.02 10*3/MM3 (ref 0–0.05)
IMM GRANULOCYTES NFR BLD AUTO: 0.3 % (ref 0–0.5)
LDLC SERPL CALC-MCNC: 69 MG/DL (ref 0–100)
LDLC/HDLC SERPL: 1.73 {RATIO}
LYMPHOCYTES # BLD AUTO: 1.45 10*3/MM3 (ref 0.7–3.1)
LYMPHOCYTES NFR BLD AUTO: 24 % (ref 19.6–45.3)
MCH RBC QN AUTO: 31.7 PG (ref 26.6–33)
MCHC RBC AUTO-ENTMCNC: 35.8 G/DL (ref 31.5–35.7)
MCV RBC AUTO: 88.5 FL (ref 79–97)
MONOCYTES # BLD AUTO: 0.55 10*3/MM3 (ref 0.1–0.9)
MONOCYTES NFR BLD AUTO: 9.1 % (ref 5–12)
NEUTROPHILS NFR BLD AUTO: 3.87 10*3/MM3 (ref 1.7–7)
NEUTROPHILS NFR BLD AUTO: 64.3 % (ref 42.7–76)
NRBC BLD AUTO-RTO: 0 /100 WBC (ref 0–0.2)
PLATELET # BLD AUTO: 267 10*3/MM3 (ref 140–450)
PMV BLD AUTO: 9.4 FL (ref 6–12)
POTASSIUM SERPL-SCNC: 4.7 MMOL/L (ref 3.5–5.2)
PROT SERPL-MCNC: 6.8 G/DL (ref 6–8.5)
RBC # BLD AUTO: 4.35 10*6/MM3 (ref 4.14–5.8)
SODIUM SERPL-SCNC: 138 MMOL/L (ref 136–145)
T4 FREE SERPL-MCNC: 1.25 NG/DL (ref 0.93–1.7)
TRIGL SERPL-MCNC: 51 MG/DL (ref 0–150)
TSH SERPL DL<=0.05 MIU/L-ACNC: 3.99 UIU/ML (ref 0.27–4.2)
VLDLC SERPL-MCNC: 12 MG/DL (ref 5–40)
WBC NRBC COR # BLD AUTO: 6.03 10*3/MM3 (ref 3.4–10.8)

## 2024-01-03 PROCEDURE — 84439 ASSAY OF FREE THYROXINE: CPT | Performed by: NURSE PRACTITIONER

## 2024-01-03 PROCEDURE — 80050 GENERAL HEALTH PANEL: CPT | Performed by: NURSE PRACTITIONER

## 2024-01-03 PROCEDURE — 83036 HEMOGLOBIN GLYCOSYLATED A1C: CPT | Performed by: NURSE PRACTITIONER

## 2024-01-03 PROCEDURE — 80061 LIPID PANEL: CPT | Performed by: NURSE PRACTITIONER

## 2024-01-03 NOTE — PROGRESS NOTES
"Chief Complaint  Follow-up (3 month follow up. The patient would like to have a BB removed from his left hand that has been there since he was 13. He states this has been getting worse over the last few years. )  Subjective    History of Present Illness  Houston Witt is a 58 y.o. male  presents to [facility] for follow-up diabetes and hyperlipidemia. He was on a statin in the past.  He is taking a herbal supplement for cholesterol. The patient is requesting referral for vasectomy and removal of BB in left hand that has been there over 40 years.     Past Medical History:   Diagnosis Date    Bronchitis     Diabetes mellitus     Hemorrhoids     Hyperlipidemia 10/03/2023    Recovering alcoholic in remission 10/03/2023    Type 2 diabetes mellitus with hyperglycemia, without long-term current use of insulin 10/03/2023        Past Surgical History:   Procedure Laterality Date    APPENDECTOMY          No Known Allergies       Current Outpatient Medications:     SITagliptin (Januvia) 50 MG tablet, Take 1 tablet by mouth Daily., Disp: 90 tablet, Rfl: 1    Objective   /90 (BP Location: Left arm, Patient Position: Sitting, Cuff Size: Adult)   Pulse 76   Temp 97 °F (36.1 °C) (Temporal)   Ht 185.4 cm (73\")   Wt 88.7 kg (195 lb 9.6 oz)   SpO2 99%   BMI 25.81 kg/m²    Estimated body mass index is 25.81 kg/m² as calculated from the following:    Height as of this encounter: 185.4 cm (73\").    Weight as of this encounter: 88.7 kg (195 lb 9.6 oz).   Physical Exam  Vitals reviewed.   Constitutional:       General: He is not in acute distress.  HENT:      Head: Normocephalic and atraumatic.   Neck:      Thyroid: No thyromegaly.   Cardiovascular:      Rate and Rhythm: Normal rate and regular rhythm.      Heart sounds: Normal heart sounds.   Pulmonary:      Effort: Pulmonary effort is normal.      Breath sounds: Normal breath sounds. No wheezing, rhonchi or rales.   Musculoskeletal:      " Left hand: Deformity present.      Comments: Left palm with encapsulated foreign body.   Skin:     General: Skin is warm and dry.   Neurological:      General: No focal deficit present.      Mental Status: He is alert.   Psychiatric:         Thought Content: Thought content normal.        Result Review :  The following data was reviewed by: SHANNON Hernandez on 01/03/2024:  Common labs          10/3/2023    10:12 10/3/2023    10:13   Common Labs   Glucose 191     BUN 18     Creatinine 0.81     Sodium 137     Potassium 5.3     Chloride 100     Calcium 10.2     Albumin 5.0     Total Bilirubin 0.5     Alkaline Phosphatase 63     AST (SGOT) 17     ALT (SGPT) 15     WBC 5.90     Hemoglobin 15.1     Hematocrit 43.9     Platelets 317     Total Cholesterol 163     Triglycerides 57     HDL Cholesterol 48     LDL Cholesterol  104     Hemoglobin A1C 7.90     Microalbumin, Urine  <1.2    PSA 0.275                   Assessment and Plan   Diagnoses and all orders for this visit:    1. Type 2 diabetes mellitus with hyperglycemia, without long-term current use of insulin (Primary)  -     Comprehensive Metabolic Panel  -     Hemoglobin A1c  -     CBC & Differential  -     Ambulatory Referral to Podiatry  -     Comprehensive Metabolic Panel; Future  -     CBC & Differential; Future  -     Hemoglobin A1c; Future    2. Hyperlipidemia, unspecified hyperlipidemia type  -     Lipid Panel  -     Lipid Panel; Future    3. Abnormal TSH  -     T4, Free  -     TSH  -     T4, Free; Future  -     TSH; Future    4. Foreign body of left hand, sequela  -     Ambulatory Referral to General Surgery    5. Visit for vasectomy evaluation  -     Ambulatory Referral to Urology       Diabetes type 2: The patient is doing well on Januvia 50 mg daily and not having side effects.  Check HA1C today and follow-up via phone/MyChart.    Hyperlipidemia: The patient is currently not on prescription medication, however he uses Corin fruit herbal supplement which  claims to help lower cholesterol.  Check lipid panel.    Abnormal TSH: Last level 3 months ago was slightly elevated and will recheck TSH and do T4 today.    Foreign body of left hand: The patient is starting to feel that the BB that has been lodged in his left palm for greater than 40 years is starting to irritate him.  Referral to general surgery.    Desires vasectomy: The patient is interested in having a vasectomy done and a referral to urology was placed.    BMI is >= 25 and <30. (Overweight) The following options were offered after discussion;: exercise counseling/recommendations and nutrition counseling/recommendations       Patient was given instructions and counseling regarding his condition or for health maintenance advice. Please see specific information pulled into the AVS if appropriate.     Follow Up   Return in about 6 months (around 7/3/2024) for Annual physical.    Dictated Utilizing Dragon Dictation.  Please note that portions of this note were completed with a voice recognition program.  Part of this note may be an electronic transcription/translation of spoken language to printed text using the Dragon Dictation System.    SHANNON Hernandez

## 2024-01-05 DIAGNOSIS — E11.65 TYPE 2 DIABETES MELLITUS WITH HYPERGLYCEMIA, WITHOUT LONG-TERM CURRENT USE OF INSULIN: Primary | ICD-10-CM

## 2024-01-12 ENCOUNTER — TELEPHONE (OUTPATIENT)
Dept: INTERNAL MEDICINE | Facility: CLINIC | Age: 59
End: 2024-01-12
Payer: COMMERCIAL

## 2024-02-05 ENCOUNTER — OFFICE VISIT (OUTPATIENT)
Dept: PODIATRY | Facility: CLINIC | Age: 59
End: 2024-02-05
Payer: COMMERCIAL

## 2024-02-05 VITALS
OXYGEN SATURATION: 97 % | HEART RATE: 78 BPM | HEIGHT: 73 IN | TEMPERATURE: 96.9 F | DIASTOLIC BLOOD PRESSURE: 68 MMHG | WEIGHT: 154 LBS | SYSTOLIC BLOOD PRESSURE: 97 MMHG | BODY MASS INDEX: 20.41 KG/M2

## 2024-02-05 DIAGNOSIS — Z79.4 TYPE 2 DIABETES MELLITUS WITH DIABETIC POLYNEUROPATHY, WITH LONG-TERM CURRENT USE OF INSULIN: Primary | ICD-10-CM

## 2024-02-05 DIAGNOSIS — E11.42 DIABETIC POLYNEUROPATHY ASSOCIATED WITH TYPE 2 DIABETES MELLITUS: ICD-10-CM

## 2024-02-05 DIAGNOSIS — F10.21 RECOVERING ALCOHOLIC IN REMISSION: ICD-10-CM

## 2024-02-05 DIAGNOSIS — E11.42 TYPE 2 DIABETES MELLITUS WITH DIABETIC POLYNEUROPATHY, WITH LONG-TERM CURRENT USE OF INSULIN: Primary | ICD-10-CM

## 2024-02-05 PROCEDURE — 99203 OFFICE O/P NEW LOW 30 MIN: CPT | Performed by: PODIATRIST

## 2024-02-05 NOTE — PROGRESS NOTES
[facility] - PODIATRY    Today's Date: 02/05/24    Patient Name: Houston Witt  MRN: [id number]  CSN: [id number]  PCP: Marianna Pagan APRN,   Referring Provider: Marianna Pagan APRN    SUBJECTIVE     Chief Complaint   Patient presents with    Left Foot - Establish Care, Annual Exam, Diabetes     Most recent blood sugar 180 last week     Right Foot - Establish Care, Annual Exam, Diabetes     HPI: Houston Witt, a 58 y.o.male, presents to clinic for a diabetic foot evaluation.    New patient with numbness tingling in his legs and feet    Stable, worsening, improving: Stable    Patient controlling diabetes via: Medication    Patient states there last blood glucose was: 180    Patient denies any fevers, chills, nausea, vomiting, shortness of breath, nor any other constitutional signs nor symptoms.    Does have a history of alcoholism which she is in remission for.    No other pedal complaints at this time.    Past Medical History:   Diagnosis Date    Bronchitis     Diabetes mellitus     Hemorrhoids     Hyperlipidemia 10/03/2023    Recovering alcoholic in remission 10/03/2023    Type 2 diabetes mellitus with hyperglycemia, without long-term current use of insulin 10/03/2023     Past Surgical History:   Procedure Laterality Date    APPENDECTOMY       Family History   Problem Relation Age of Onset    Colon cancer Mother      Social History     Socioeconomic History    Marital status:    Tobacco Use    Smoking status: Never     Passive exposure: Never    Smokeless tobacco: Never   Vaping Use    Vaping Use: Never used   Substance and Sexual Activity    Alcohol use: Never     Comment: IN RECOVER 22 YEARS    Drug use: Never    Sexual activity: Defer     No Known Allergies  Current Outpatient Medications   Medication Sig Dispense Refill    SITagliptin (Januvia) 100 MG tablet Take 1 tablet by mouth Daily. 90 tablet 1    Ibuprofen 3 %, Gabapentin 10 %, Baclofen 2 %, lidocaine 4 %, Ketamine HCl 4 %  Apply 1-2 g topically to the appropriate area as directed 3 (Three) to 4 (Four) times daily. 90 g 2     No current facility-administered medications for this visit.     Review of Systems   Neurological:         Numbness tingling in feet and legs   All other systems reviewed and are negative.      OBJECTIVE     Vitals:    02/05/24 0742   BP: 97/68   Pulse: 78   Temp: 96.9 °F (36.1 °C)   SpO2: 97%       Body mass index is 20.32 kg/m².    Lab Results   Component Value Date    HGBA1C 6.70 (H) 01/03/2024       Lab Results   Component Value Date    GLUCOSE 143 (H) 01/03/2024    CALCIUM 9.9 01/03/2024     01/03/2024    K 4.7 01/03/2024    CO2 27.2 01/03/2024     01/03/2024    BUN 20 01/03/2024    CREATININE 0.93 01/03/2024    BCR 21.5 01/03/2024    ANIONGAP 9.8 01/03/2024       Patient seen in no apparent distress.      PHYSICAL EXAM:     Foot/Ankle Exam    GENERAL  Appearance:  appears stated age  Orientation:  AAOx3  Affect:  appropriate  Gait:  unimpaired  Assistance:  independent  Right shoe gear: casual shoe  Left shoe gear: casual shoe    VASCULAR     Right Foot Vascularity   Normal vascular exam    Dorsalis pedis:  2+  Posterior tibial:  2+  Skin temperature:  warm  Edema grading:  None  CFT:  < 3 seconds  Pedal hair growth:  Present  Varicosities:  none     Left Foot Vascularity   Normal vascular exam    Dorsalis pedis:  2+  Posterior tibial:  2+  Skin temperature:  warm  Edema grading:  None  CFT:  < 3 seconds  Pedal hair growth:  Present  Varicosities:  none     NEUROLOGIC     Right Foot Neurologic   Normal sensation    Light touch sensation: normal  Vibratory sensation: normal  Hot/Cold sensation: normal  Protective Sensation using Wellington-Marisol Monofilament:   Sites intact: 10  Sites tested: 10     Left Foot Neurologic   Normal sensation    Light touch sensation: normal  Vibratory sensation: normal  Hot/Cold sensation:  normal  Protective Sensation using Wellington-Marisol Monofilament:   Sites  intact: 10  Sites tested: 10    MUSCLE STRENGTH     Right Foot Muscle Strength   Foot dorsiflexion:  4  Foot plantar flexion:  4  Foot inversion:  4  Foot eversion:  4     Left Foot Muscle Strength   Foot dorsiflexion:  4  Foot plantar flexion:  4  Foot inversion:  4  Foot eversion:  4    RANGE OF MOTION     Right Foot Range of Motion   Foot and ankle ROM within normal limits       Left Foot Range of Motion   Foot and ankle ROM within normal limits      DERMATOLOGIC      Right Foot Dermatologic   Skin  Right foot skin is intact.      Left Foot Dermatologic   Skin  Left foot skin is intact.             ASSESSMENT/PLAN     Diagnoses and all orders for this visit:    1. Type 2 diabetes mellitus with diabetic polyneuropathy, with long-term current use of insulin (Primary)  -     Ibuprofen 3 %, Gabapentin 10 %, Baclofen 2 %, lidocaine 4 %, Ketamine HCl 4 %; Apply 1-2 g topically to the appropriate area as directed 3 (Three) to 4 (Four) times daily.  Dispense: 90 g; Refill: 2    2. Diabetic polyneuropathy associated with type 2 diabetes mellitus    3. Recovering alcoholic in remission        Comprehensive lower extremity examination and evaluation was performed.    Discussed findings and treatment plan including risks, benefits, and treatment options with patient in detail. Patient agreed with treatment plan.    Medications and allergies reviewed.  Reviewed available blood glucose and HgB A1C lab values along with other pertinent labs.  These were discussed with the patient as to their importance of diabetic maintenance.    Diabetic foot exam performed and documented this date, compliant with CQM required standards. Detail of findings as noted in physical exam.  Lower extremity Neurologic exam for diabetic patient performed and documented this date, compliant with PQRS required standards. Detail of findings as noted in physical exam.  Advised patient importance of good routine lower extremity hygiene. Advised patient  importance of evaluating for intact skin and pain free nail borders.  Advised patient to use mirror to evaluate plantar/ soles of feet for better visualization. Advised patient monitor and phone office to be seen if any cracking to skin, open lesions, painful nail borders or if nails become elongated prior to next visit. Advised patient importance of daily cleansing of lower extremities, followed by good skin cream to maintain normal hydration of skin. Also advised patient importance of close daily monitoring of blood sugar. Advised to regulate diet and medications to maintain control of blood sugar in optimal range. Contact primary care provider if difficulties maintaining blood sugar levels.  Advised Patient of presence of Diabetes Mellitus condition.  Advised Patient risk of progression and worsening or improvement, then return of condition.  Will monitor condition for any change in future. Treat with most appropriate treatment pending status of condition.  Counseled and advised patient extensively on nature and ramifications of diabetes. Standard instructions given to patient for good diabetic foot care and maintenance. Advised importance of careful monitoring to avoid break down and complications secondary to diabetes. Advised patient importance of strict maintenance of blood sugar control. Advised patient of possible ominous results from neglect of condition, i.e.: amputation/ loss of digits, feet and legs, or even death.  Patient states understands counseling, will monitor closely, continue good hygiene and routine diabetic foot care. Patient will contact office if questions or problems.      An After Visit Summary was printed and given to the patient at discharge, including (if requested) any available informative/educational handouts regarding diagnosis, treatment, or medications. All questions were answered to patient/family satisfaction. Should symptoms fail to improve or worsen they agree to call or  return to clinic or to go to the Emergency Department. Discussed the importance of following up with any needed screening tests/labs/specialist appointments and any requested follow-up recommended by me today. Importance of maintaining follow-up discussed and patient accepts that missed appointments can delay diagnosis and potentially lead to worsening of conditions.    Return in about 1 year (around 2/5/2025)., or sooner if acute issues arise.    This document has been electronically signed by Luisito Perez DPM on February 5, 2024 09:30 EST

## 2024-02-13 ENCOUNTER — TELEPHONE (OUTPATIENT)
Dept: INTERNAL MEDICINE | Age: 59
End: 2024-02-13

## 2024-02-13 NOTE — TELEPHONE ENCOUNTER
Caller: Houston Witt    Relationship to patient: Self    Best call back number: [phone]    Patient is needing: PATIENT IS REQUESTING A CALL BACK. HE CALLED INSURANCE COMPANY AND THEY NEED A PRIOR AUTHORIZATION TO AUTHORIZE A 90 DAY APPROVAL. HE LEFT THE OLD JOB SO HIS NEW INSURANCE WON'T START UNTIL 2 MONTHS. THAT'S WHY THEY NEEDING AN APPROVAL FOR THE DIABETIC MEDICATION. PLEASE CALL AND ADVISE.

## 2024-02-15 ENCOUNTER — TELEPHONE (OUTPATIENT)
Dept: INTERNAL MEDICINE | Facility: CLINIC | Age: 59
End: 2024-02-15
Payer: COMMERCIAL

## 2024-02-27 ENCOUNTER — TELEPHONE (OUTPATIENT)
Dept: SURGERY | Facility: CLINIC | Age: 59
End: 2024-02-27
Payer: COMMERCIAL

## 2024-02-27 ENCOUNTER — TELEPHONE (OUTPATIENT)
Dept: INTERNAL MEDICINE | Facility: CLINIC | Age: 59
End: 2024-02-27
Payer: COMMERCIAL

## 2024-02-27 NOTE — TELEPHONE ENCOUNTER
Pt continues to have issues getting the Januvia 100mg medication prescribed. Patient is supposed to be getting new insurance in a week or two and we will try a new PA then.    For now, pt has been told to come into office to  a 14 day supply of the medication.

## 2024-02-27 NOTE — TELEPHONE ENCOUNTER
Called pt to rs colonoscopy. He rs to 5-22-24 with a 930am arrival time. Spoke to Tabitha in surgery scheduling.

## 2024-02-27 NOTE — TELEPHONE ENCOUNTER
Caller: BEBE SANTACRUZ    Relationship: SELF    Best call back number: [phone]     What is the best time to reach you: ANYTIME    Who are you requesting to speak with (clinical staff, provider,  specific staff member): SURGERY SCHEDULER    Do you know the name of the person who called: BEBE    What was the call regarding: PT IS NEEDING TO RESCHEDULE THE COLONOSCOPY DUE TO A CHANGE IN JOBS AND HEALTH INSURANCE WILL BE CHANGING - DOES NOT CURRENTLY HAVE COVERAGE, BUT WILL IN A FEW WEEKS    Is it okay if the provider responds through [employer]: NO, PLEASE CALL - OKAY TO LEAVE DETAILED VM

## 2024-03-04 ENCOUNTER — TELEPHONE (OUTPATIENT)
Dept: INTERNAL MEDICINE | Facility: CLINIC | Age: 59
End: 2024-03-04
Payer: COMMERCIAL

## 2024-03-04 NOTE — TELEPHONE ENCOUNTER
Did patient PA on covermy meds with patient KEY given and it came back that patient has no member benefit  that includes pharacy drug coverage

## 2024-03-18 ENCOUNTER — TELEPHONE (OUTPATIENT)
Dept: INTERNAL MEDICINE | Age: 59
End: 2024-03-18

## 2024-03-18 RX ORDER — GLIMEPIRIDE 1 MG/1
1 TABLET ORAL
Qty: 90 TABLET | Refills: 1 | Status: SHIPPED | OUTPATIENT
Start: 2024-03-18

## 2024-03-18 NOTE — TELEPHONE ENCOUNTER
Caller: Houston Witt    Relationship: Self    Best call back number: [phone]     What medication are you requesting: REPLACEMENT FOR JANUVIA      If a prescription is needed, what is your preferred pharmacy and phone number: [address] - [phone]  - [phone] FX     Additional notes:  PATIENT REPORTS THAT DUE TO INSURANCE CHANGE, HIS JANUVIA WILL COST HIM OVER 500 DOLLARS AND PHARMACY RECOMMENDED MAYBE GLIPIZIDE    PATIENT IS OUT OF JANUVIA AND NEEDS REPLACEMENT AS SOON AS POSSIBLE, WOULD LIKE TO  TODAY.    PLEASE ADVISE AND CALL PATIENT TO CONFIRM

## 2024-04-15 RX ORDER — GLIMEPIRIDE 2 MG/1
2 TABLET ORAL
Qty: 90 TABLET | Refills: 1 | Status: SHIPPED | OUTPATIENT
Start: 2024-04-15

## 2024-05-06 ENCOUNTER — TELEPHONE (OUTPATIENT)
Dept: INTERNAL MEDICINE | Age: 59
End: 2024-05-06
Payer: COMMERCIAL

## 2024-05-15 NOTE — PAT
Left message on voicemail with arrival time of  0930    Bring picture ID and insurance card, have  over 18 to give ride home.     Bring medication list but do not take any meds except inhalers that morning. Bring medications with you to the hospital, including inhalers.     Complete prep prior to arrival and call [phone] with any questions.     Come to [facility].    I will START or STAY ON the medications listed below when I get home from the hospital:    PICC CARE AS PER PROTOCOL 10ml saline flush before and after administration of antibiotics  -- 1  between cheek and gums 2 times a day   -- Indication: For Sepsis    Weekly cbc, bmp, vanco through faxed to me at [phone]  -- 1 unit(s) subcutaneously once a week   -- Indication: For Sepsis    oxyCODONE-acetaminophen 5 mg-325 mg oral tablet  -- 1 tab(s) by mouth every 6 hours, As Needed -Severe Pain (7 - 10) MDD:20mg  -- Indication: For Pain    acetaminophen 325 mg oral tablet  -- 2 tab(s) by mouth every 6 hours, As needed, Moderate Pain (4 - 6)  -- Indication: For Pain    enalapril 10 mg oral tablet  -- 1 tab(s) by mouth once a day  -- Indication: For Htn    methotrexate  -- orally once a week on Tuesdays   hold off  until infection resolves and you are cleared by Infectious disease and rheumatology   -- Indication: For Psoriatic arthritis    halobetasol 0.05% topical ointment  -- Apply on skin to affected area once a day, As Needed  -- Indication: For Psoriatic arthritis    vancomycin 1 g intravenous injection  -- 1 gram(s) intravenous every 12 hours through 11/2   -- Indication: For Sepsis    nicotine 21 mg/24 hr transdermal film, extended release  -- 1  by transdermal patch once a day  continue as directed   -- Indication: For Smoking cessation     folic acid 1 mg oral tablet  -- 1 tab(s) by mouth once a day  -- Indication: For Supplement

## 2024-05-16 ENCOUNTER — TELEPHONE (OUTPATIENT)
Dept: SURGERY | Facility: CLINIC | Age: 59
End: 2024-05-16
Payer: COMMERCIAL

## 2024-05-16 NOTE — TELEPHONE ENCOUNTER
Patient called to reschedule his colonoscopy on 5/22/24. He stated he would like to have it done in the next month or so.

## 2024-06-04 ENCOUNTER — TELEPHONE (OUTPATIENT)
Dept: SURGERY | Facility: CLINIC | Age: 59
End: 2024-06-04
Payer: COMMERCIAL

## 2024-06-04 NOTE — TELEPHONE ENCOUNTER
Caller: Houston Witt    Relationship: Self    Best call back number: [phone]    What is the best time to reach you: ANYTIME    Who are you requesting to speak with (clinical staff, provider,  specific staff member): NURSE OR DR. AMOS    PATIENT HAS SOME QUESTIONS REGARDING HIS UPCOMING COLONOSCOPY ABOUT HOW MUCH TIME HIM AND HIS WIFE NEED OFF WORK AND SOME OTHER THINGS. PLEASE CALL TO DISCUSS.

## 2024-06-04 NOTE — TELEPHONE ENCOUNTER
Called & spoke with pt. Answered all of his questions. Uploaded instructions for bowel prep to his [employer]

## 2024-06-07 ENCOUNTER — TELEPHONE (OUTPATIENT)
Dept: INTERNAL MEDICINE | Age: 59
End: 2024-06-07

## 2024-06-07 NOTE — TELEPHONE ENCOUNTER
Caller: Houston Witt    Relationship: Self    Best call back number: [phone]     What medication are you requesting: SOMETHING TO HELP WITH SYMPTOMS     What are your current symptoms: RINGING, PAIN,  AND SENSITIVITY TO SOUND IN LEFT EAR     How long have you been experiencing symptoms: 7 DAYS     Have you had these symptoms before:    [x] Yes  [] No    Have you been treated for these symptoms before:   [x] Yes  [] No    If a prescription is needed, what is your preferred pharmacy and phone number: [address] - [phone]  - [phone] FX     Additional notes:

## 2024-06-24 ENCOUNTER — OFFICE VISIT (OUTPATIENT)
Dept: INTERNAL MEDICINE | Age: 59
End: 2024-06-24
Payer: COMMERCIAL

## 2024-06-24 VITALS
BODY MASS INDEX: 21.87 KG/M2 | SYSTOLIC BLOOD PRESSURE: 116 MMHG | DIASTOLIC BLOOD PRESSURE: 80 MMHG | OXYGEN SATURATION: 94 % | HEART RATE: 78 BPM | TEMPERATURE: 97.3 F | HEIGHT: 73 IN | WEIGHT: 165 LBS

## 2024-06-24 DIAGNOSIS — H69.92 DYSFUNCTION OF LEFT EUSTACHIAN TUBE: ICD-10-CM

## 2024-06-24 DIAGNOSIS — H93.12 TINNITUS OF LEFT EAR: ICD-10-CM

## 2024-06-24 DIAGNOSIS — E11.65 TYPE 2 DIABETES MELLITUS WITH HYPERGLYCEMIA, WITHOUT LONG-TERM CURRENT USE OF INSULIN: Chronic | ICD-10-CM

## 2024-06-24 DIAGNOSIS — H60.92 OTITIS EXTERNA OF LEFT EAR, UNSPECIFIED CHRONICITY, UNSPECIFIED TYPE: Primary | ICD-10-CM

## 2024-06-24 PROCEDURE — 99214 OFFICE O/P EST MOD 30 MIN: CPT | Performed by: NURSE PRACTITIONER

## 2024-06-24 RX ORDER — FLUTICASONE PROPIONATE 50 MCG
2 SPRAY, SUSPENSION (ML) NASAL DAILY
Qty: 16 G | Refills: 1 | Status: SHIPPED | OUTPATIENT
Start: 2024-06-24

## 2024-06-24 RX ORDER — CIPROFLOXACIN AND DEXAMETHASONE 3; 1 MG/ML; MG/ML
4 SUSPENSION/ DROPS AURICULAR (OTIC) 2 TIMES DAILY
Qty: 7.5 ML | Refills: 0 | Status: SHIPPED | OUTPATIENT
Start: 2024-06-24 | End: 2024-07-01

## 2024-06-24 NOTE — PROGRESS NOTES
Chief Complaint  Tinnitus (The patient is complaining of an ear issue. He states that he wakes up with ringing/buzzing in his left ear. He states he used to get ear infections a lot years ago. He says it went away and then came back. He states its very sensitive, he has a cotton ball in it currently. He states its tender in the ear. No neck/throat pain. )  Subjective      History of Present Illness  Houston Witt is a 58 y.o. male who presents to [facility] for an acute visit for complaint of left ear ringing, buzzing, throbbing and sensitivity for 3 weeks. Hearing has been muffled.     He could not get Januvia due to cost and not covered under insurance. He has had blood sugars in the 200s.    Review of Systems  Constitutional: Negative for fever and chills.   HEENT: Negative for sinus pain and sore throat. Positive for nasal congestion.   Respiratory: Negative for cough, wheezing and dyspnea.   Neurologic: Negative for headaches. Positive for intermittent dizziness.     Patient Care Team:  Marianna Pagan APRN as PCP - General (Nurse Practitioner)  Angeline Castaneda APRN as Nurse Practitioner (Nurse Practitioner)    Past Medical History:   Diagnosis Date    Bronchitis     Diabetes mellitus     Hemorrhoids     Hyperlipidemia 10/03/2023    Recovering alcoholic in remission 10/03/2023    Type 2 diabetes mellitus with hyperglycemia, without long-term current use of insulin 10/03/2023        Past Surgical History:   Procedure Laterality Date    APPENDECTOMY          No Known Allergies       Current Outpatient Medications:     glimepiride (Amaryl) 2 MG tablet, Take 1 tablet by mouth Every Morning Before Breakfast., Disp: 90 tablet, Rfl: 1    Ibuprofen 3 %, Gabapentin 10 %, Baclofen 2 %, lidocaine 4 %, Ketamine HCl 4 %, Apply 1-2 g topically to the appropriate area as directed 3 (Three) to 4 (Four) times daily., Disp: 90 g, Rfl: 2    ciprofloxacin-dexAMETHasone (Ciprodex) 0.3-0.1 %  "otic suspension, Administer 4 drops into ear(s) as directed by provider 2 (Two) Times a Day for 7 days. Instill into the affected ear., Disp: 7.5 mL, Rfl: 0    fluticasone (FLONASE) 50 MCG/ACT nasal spray, 2 sprays into the nostril(s) as directed by provider Daily., Disp: 16 g, Rfl: 1    Objective   /80 (BP Location: Left arm, Patient Position: Sitting, Cuff Size: Adult)   Pulse 78   Temp 97.3 °F (36.3 °C) (Temporal)   Ht 185.4 cm (73\")   Wt 74.8 kg (165 lb)   SpO2 94%   BMI 21.77 kg/m²    Estimated body mass index is 21.77 kg/m² as calculated from the following:    Height as of this encounter: 185.4 cm (73\").    Weight as of this encounter: 74.8 kg (165 lb).   Physical Exam  Vitals reviewed.   Constitutional:       General: He is not in acute distress.  HENT:      Head: Normocephalic and atraumatic.      Right Ear: Tympanic membrane and ear canal normal.      Left Ear: Tympanic membrane normal. Tenderness present.   Eyes:      Conjunctiva/sclera: Conjunctivae normal.   Pulmonary:      Effort: Pulmonary effort is normal.   Lymphadenopathy:      Cervical: No cervical adenopathy.   Neurological:      General: No focal deficit present.      Mental Status: He is alert.   Psychiatric:         Thought Content: Thought content normal.        Result Review :  The following data was reviewed by: SHANNON Hernandez on 06/24/2024:  CMP          10/3/2023    10:12 1/3/2024    09:33   CMP   Glucose 191  143    BUN 18  20    Creatinine 0.81  0.93    EGFR 103.5  95.8    Sodium 137  138    Potassium 5.3  4.7    Chloride 100  101    Calcium 10.2  9.9    Total Protein 7.5  6.8    Albumin 5.0  4.6    Globulin 2.5  2.2    Total Bilirubin 0.5  0.4    Alkaline Phosphatase 63  64    AST (SGOT) 17  18    ALT (SGPT) 15  14    Albumin/Globulin Ratio 2.0  2.1    BUN/Creatinine Ratio 22.2  21.5    Anion Gap 7.8  9.8      A1C Last 3 Results          10/3/2023    10:12 1/3/2024    09:33   HGBA1C Last 3 Results   Hemoglobin A1C " 7.90  6.70                  Assessment and Plan   Diagnoses and all orders for this visit:    1. Otitis externa of left ear, unspecified chronicity, unspecified type (Primary)  Comments:  Start Cipodex ear drops twice a day and follow up next week.    2. Tinnitus of left ear  Comments:  Use of flonase twice a day and follow up next week.    3. Dysfunction of left eustachian tube  Comments:  Use of flonase twice a day and follow up next week.    4. Type 2 diabetes mellitus with hyperglycemia, without long-term current use of insulin  Comments:  FBS at home 200s. The cost high for Januvia and pt changed to glimperide 2 mg daily. Check FBS and if elevated take glimperide twice a day.    Other orders  -     ciprofloxacin-dexAMETHasone (Ciprodex) 0.3-0.1 % otic suspension; Administer 4 drops into ear(s) as directed by provider 2 (Two) Times a Day for 7 days. Instill into the affected ear.  Dispense: 7.5 mL; Refill: 0  -     fluticasone (FLONASE) 50 MCG/ACT nasal spray; 2 sprays into the nostril(s) as directed by provider Daily.  Dispense: 16 g; Refill: 1      Patient was given instructions and counseling regarding his condition. Please see specific information pulled into the AVS if appropriate.     Follow Up   If not improving or worsening the patient was instructed to follow-up.    Dictated Utilizing Dragon Dictation.  Please note that portions of this note were completed with a voice recognition program.  Part of this note may be an electronic transcription/translation of spoken language to printed text using the Dragon Dictation System.    SHANNON Hernandez

## 2024-06-26 ENCOUNTER — HOSPITAL ENCOUNTER (EMERGENCY)
Facility: HOSPITAL | Age: 59
Discharge: HOME | End: 2024-06-26
Attending: EMERGENCY MEDICINE
Payer: COMMERCIAL

## 2024-06-26 ENCOUNTER — APPOINTMENT (OUTPATIENT)
Dept: CT IMAGING | Facility: HOSPITAL | Age: 59
End: 2024-06-26
Payer: COMMERCIAL

## 2024-06-26 VITALS
OXYGEN SATURATION: 100 % | HEART RATE: 76 BPM | SYSTOLIC BLOOD PRESSURE: 120 MMHG | DIASTOLIC BLOOD PRESSURE: 76 MMHG | TEMPERATURE: 98.2 F | RESPIRATION RATE: 18 BRPM

## 2024-06-26 DIAGNOSIS — H93.12 TINNITUS OF LEFT EAR: Primary | ICD-10-CM

## 2024-06-26 LAB
ALBUMIN SERPL-MCNC: 4.3 G/DL (ref 3.5–5.2)
ALBUMIN/GLOB SERPL: 1.6 G/DL
ALP SERPL-CCNC: 61 U/L (ref 39–117)
ALT SERPL W P-5'-P-CCNC: 19 U/L (ref 1–41)
ANION GAP SERPL CALCULATED.3IONS-SCNC: 9.6 MMOL/L (ref 5–15)
AST SERPL-CCNC: 19 U/L (ref 1–40)
BASOPHILS # BLD AUTO: 0.04 10*3/MM3 (ref 0–0.2)
BASOPHILS NFR BLD AUTO: 0.5 % (ref 0–1.5)
BILIRUB SERPL-MCNC: 0.3 MG/DL (ref 0–1.2)
BUN SERPL-MCNC: 34 MG/DL (ref 6–20)
BUN/CREAT SERPL: 35.1 (ref 7–25)
CALCIUM SPEC-SCNC: 9.3 MG/DL (ref 8.6–10.5)
CHLORIDE SERPL-SCNC: 98 MMOL/L (ref 98–107)
CO2 SERPL-SCNC: 28.4 MMOL/L (ref 22–29)
CREAT SERPL-MCNC: 0.97 MG/DL (ref 0.76–1.27)
DEPRECATED RDW RBC AUTO: 39.1 FL (ref 37–54)
EGFRCR SERPLBLD CKD-EPI 2021: 91.1 ML/MIN/1.73
EOSINOPHIL # BLD AUTO: 0.15 10*3/MM3 (ref 0–0.4)
EOSINOPHIL NFR BLD AUTO: 2.1 % (ref 0.3–6.2)
ERYTHROCYTE [DISTWIDTH] IN BLOOD BY AUTOMATED COUNT: 12.3 % (ref 12.3–15.4)
GLOBULIN UR ELPH-MCNC: 2.7 GM/DL
GLUCOSE SERPL-MCNC: 215 MG/DL (ref 65–99)
HCT VFR BLD AUTO: 39.7 % (ref 37.5–51)
HGB BLD-MCNC: 14.2 G/DL (ref 13–17.7)
IMM GRANULOCYTES # BLD AUTO: 0.02 10*3/MM3 (ref 0–0.05)
IMM GRANULOCYTES NFR BLD AUTO: 0.3 % (ref 0–0.5)
LYMPHOCYTES # BLD AUTO: 1.62 10*3/MM3 (ref 0.7–3.1)
LYMPHOCYTES NFR BLD AUTO: 22.2 % (ref 19.6–45.3)
MAGNESIUM SERPL-MCNC: 2.1 MG/DL (ref 1.6–2.6)
MCH RBC QN AUTO: 31.1 PG (ref 26.6–33)
MCHC RBC AUTO-ENTMCNC: 35.8 G/DL (ref 31.5–35.7)
MCV RBC AUTO: 87.1 FL (ref 79–97)
MONOCYTES # BLD AUTO: 0.51 10*3/MM3 (ref 0.1–0.9)
MONOCYTES NFR BLD AUTO: 7 % (ref 5–12)
NEUTROPHILS NFR BLD AUTO: 4.96 10*3/MM3 (ref 1.7–7)
NEUTROPHILS NFR BLD AUTO: 67.9 % (ref 42.7–76)
NRBC BLD AUTO-RTO: 0 /100 WBC (ref 0–0.2)
PLATELET # BLD AUTO: 232 10*3/MM3 (ref 140–450)
PMV BLD AUTO: 8.5 FL (ref 6–12)
POTASSIUM SERPL-SCNC: 4.2 MMOL/L (ref 3.5–5.2)
PROT SERPL-MCNC: 7 G/DL (ref 6–8.5)
RBC # BLD AUTO: 4.56 10*6/MM3 (ref 4.14–5.8)
SODIUM SERPL-SCNC: 136 MMOL/L (ref 136–145)
WBC NRBC COR # BLD AUTO: 7.3 10*3/MM3 (ref 3.4–10.8)

## 2024-06-26 PROCEDURE — 83735 ASSAY OF MAGNESIUM: CPT

## 2024-06-26 PROCEDURE — 36415 COLL VENOUS BLD VENIPUNCTURE: CPT

## 2024-06-26 PROCEDURE — 80053 COMPREHEN METABOLIC PANEL: CPT

## 2024-06-26 PROCEDURE — 99284 EMERGENCY DEPT VISIT MOD MDM: CPT

## 2024-06-26 PROCEDURE — 70450 CT HEAD/BRAIN W/O DYE: CPT

## 2024-06-26 PROCEDURE — 85025 COMPLETE CBC W/AUTO DIFF WBC: CPT

## 2024-06-26 NOTE — DISCHARGE INSTRUCTIONS
The labs and CT completed in the emergency department today are normal.  I have sent referral to our ear nose throat doctor, they should call you within the next few days to schedule a follow-up appointment with you for evaluation of the tinnitus.  Continue medications that were started by your family doctor yesterday.

## 2024-06-26 NOTE — ED PROVIDER NOTES
Time: 4:18 PM EDT  Date of encounter:  6/26/2024  Independent Historian/Clinical History and Information was obtained by:   Patient    History is limited by: N/A    Chief Complaint: Dizziness      History of Present Illness:  Patient is a 58 y.o. year old male who presents to the emergency department for evaluation of dizziness.  Patient states he has had a couple of recurring episodes of dizziness however he does not feel dizzy at this time.  He states at work today the dizziness seem to be worse with position changes.  He states for approximately the last 3 weeks he has been dealing with pain in his left ear and also noticed muffled hearing along with tinnitus.  Patient states that he seen his PCP yesterday and was given antibiotic and steroid drops for the ear.  Patient states that he is concerned because he read an article about acoustic neuroma and he has family history of cancer and he wants to ensure that he does not have this.  Again patient not having any dizziness at this time.  He does feel congested.  Patient denies any fever.  Patient does state when he was younger he had a history of recurring ear infections but since he stopped drinking alcohol that seem to resolve.    HPI    Patient Care Team  Primary Care Provider: Marianna Pagan APRN    Past Medical History:     No Known Allergies  Past Medical History:   Diagnosis Date    Bronchitis     Diabetes mellitus     Hemorrhoids     Hyperlipidemia 10/03/2023    Recovering alcoholic in remission 10/03/2023    Type 2 diabetes mellitus with hyperglycemia, without long-term current use of insulin 10/03/2023     Past Surgical History:   Procedure Laterality Date    APPENDECTOMY       Family History   Problem Relation Age of Onset    Colon cancer Mother        Home Medications:  Prior to Admission medications    Medication Sig Start Date End Date Taking? Authorizing Provider   ciprofloxacin-dexAMETHasone (Ciprodex) 0.3-0.1 % otic suspension Administer 4 drops  into ear(s) as directed by provider 2 (Two) Times a Day for 7 days. Instill into the affected ear. 6/24/24 7/1/24  Marianna Pagan APRN   fluticasone (FLONASE) 50 MCG/ACT nasal spray 2 sprays into the nostril(s) as directed by provider Daily. 6/24/24   Marianna Pagan APRN   glimepiride (Amaryl) 2 MG tablet Take 1 tablet by mouth Every Morning Before Breakfast. 4/15/24   Marianna Pagan APRN   Ibuprofen 3 %, Gabapentin 10 %, Baclofen 2 %, lidocaine 4 %, Ketamine HCl 4 % Apply 1-2 g topically to the appropriate area as directed 3 (Three) to 4 (Four) times daily. 2/5/24 2/4/25  Luisito Perez, CARROLL        Social History:   Social History     Tobacco Use    Smoking status: Never     Passive exposure: Never    Smokeless tobacco: Never   Vaping Use    Vaping status: Never Used   Substance Use Topics    Alcohol use: Never     Comment: IN RECOVER 22 YEARS    Drug use: Never         Review of Systems:  Review of Systems   HENT:  Positive for ear pain and tinnitus.    Respiratory:  Negative for shortness of breath.    Cardiovascular:  Negative for chest pain.   Neurological:  Positive for dizziness.   All other systems reviewed and are negative.       Physical Exam:  /85 (BP Location: Left arm)   Pulse 73   Temp 98 °F (36.7 °C) (Oral)   Resp 18   SpO2 99%     Physical Exam  Vitals and nursing note reviewed.   Constitutional:       Appearance: Normal appearance. He is normal weight.   HENT:      Head: Normocephalic and atraumatic.      Right Ear: Tympanic membrane, ear canal and external ear normal.      Left Ear: Ear canal normal. Tympanic membrane is scarred. Tympanic membrane is not perforated, erythematous, retracted or bulging.      Nose: Nose normal.   Eyes:      Extraocular Movements: Extraocular movements intact.      Conjunctiva/sclera: Conjunctivae normal.      Pupils: Pupils are equal, round, and reactive to light.   Cardiovascular:      Rate and Rhythm: Normal rate and regular rhythm.      Heart  sounds: Normal heart sounds.   Pulmonary:      Effort: Pulmonary effort is normal.      Breath sounds: Normal breath sounds.   Abdominal:      General: Abdomen is flat. There is no distension.   Musculoskeletal:         General: Normal range of motion.      Cervical back: Normal range of motion and neck supple.   Skin:     General: Skin is warm and dry.      Coloration: Skin is not cyanotic.   Neurological:      General: No focal deficit present.      Mental Status: He is alert and oriented to person, place, and time.   Psychiatric:         Attention and Perception: Attention and perception normal.         Mood and Affect: Mood normal.         Behavior: Behavior normal.         Thought Content: Thought content normal.         Judgment: Judgment normal.                Procedures:  Procedures      Medical Decision Making:    Comorbidities that affect care:    Diabetes, hyperlipidemia, substance abuse    External Notes reviewed:    Previous Clinic Note: Family medicine office visit from yesterday where patient was seen for same complaint of ear pain      The following orders were placed and all results were independently analyzed by me:  Orders Placed This Encounter   Procedures    CT Head Without Contrast    Comprehensive Metabolic Panel    Magnesium    CBC Auto Differential    Ambulatory Referral to ENT (Otolaryngology)    CBC & Differential       Medications Given in the Emergency Department:  Medications - No data to display     ED Course:    ED Course as of 06/26/24 1831 Wed Jun 26, 2024   1618 -- PROVIDER IN TRIAGE NOTE ---    The patient was evaluated by me, SHANNON Hdez, in triage. Orders were placed and the patient is currently awaiting disposition.    [CB]      ED Course User Index  [CB] Aide Low APRN       Labs:    Lab Results (last 24 hours)       Procedure Component Value Units Date/Time    CBC & Differential [[id number]]  (Abnormal) Collected: 06/26/24 1802    Specimen: Blood Updated:  06/26/24 1809    Narrative:      The following orders were created for panel order CBC & Differential.  Procedure                               Abnormality         Status                     ---------                               -----------         ------                     CBC Auto Differential[[id number]]        Abnormal            Final result                 Please view results for these tests on the individual orders.    Comprehensive Metabolic Panel [[id number]]  (Abnormal) Collected: 06/26/24 1802    Specimen: Blood Updated: 06/26/24 1828     Glucose 215 mg/dL      BUN 34 mg/dL      Creatinine 0.97 mg/dL      Sodium 136 mmol/L      Potassium 4.2 mmol/L      Chloride 98 mmol/L      CO2 28.4 mmol/L      Calcium 9.3 mg/dL      Total Protein 7.0 g/dL      Albumin 4.3 g/dL      ALT (SGPT) 19 U/L      AST (SGOT) 19 U/L      Alkaline Phosphatase 61 U/L      Total Bilirubin 0.3 mg/dL      Globulin 2.7 gm/dL      A/G Ratio 1.6 g/dL      BUN/Creatinine Ratio 35.1     Anion Gap 9.6 mmol/L      eGFR 91.1 mL/min/1.73     Narrative:      GFR Normal >60  Chronic Kidney Disease <60  Kidney Failure <15      Magnesium [[id number]]  (Normal) Collected: 06/26/24 1802    Specimen: Blood Updated: 06/26/24 1828     Magnesium 2.1 mg/dL     CBC Auto Differential [[id number]]  (Abnormal) Collected: 06/26/24 1802    Specimen: Blood Updated: 06/26/24 1809     WBC 7.30 10*3/mm3      RBC 4.56 10*6/mm3      Hemoglobin 14.2 g/dL      Hematocrit 39.7 %      MCV 87.1 fL      MCH 31.1 pg      MCHC 35.8 g/dL      RDW 12.3 %      RDW-SD 39.1 fl      MPV 8.5 fL      Platelets 232 10*3/mm3      Neutrophil % 67.9 %      Lymphocyte % 22.2 %      Monocyte % 7.0 %      Eosinophil % 2.1 %      Basophil % 0.5 %      Immature Grans % 0.3 %      Neutrophils, Absolute 4.96 10*3/mm3      Lymphocytes, Absolute 1.62 10*3/mm3      Monocytes, Absolute 0.51 10*3/mm3      Eosinophils, Absolute 0.15 10*3/mm3      Basophils, Absolute 0.04 10*3/mm3      Immature  Grans, Absolute 0.02 10*3/mm3      nRBC 0.0 /100 WBC              Imaging:    CT Head Without Contrast    Result Date: 6/26/2024  CT HEAD WO CONTRAST Date of Exam: 6/26/2024 5:45 PM EDT Indication: Dizziness. Comparison: None available. Technique: Axial CT images were obtained of the head without contrast administration.  Reconstructed coronal and sagittal images were also obtained. Automated exposure control and iterative construction methods were used. Findings: There is no CT evidence of acute hemorrhage, infarct, mass, mass effect, or midline shift. The gray-white matter differentiation is preserved. There is no hydrocephalus. The sulci and ventricles are symmetric.  No extraaxial fluid collections. The globes  and orbital contents are normal and symmetric. The mastoid air cells and visualized paranasal sinuses are clear. No skull fractures or suspicious calvarial lesions.     Impression: No acute intracranial abnormality. Electronically Signed: Clint Silverio DO  6/26/2024 6:22 PM EDT  Workstation ID: [id number]       Differential Diagnosis and Discussion:    Dizziness: Based on the patient's history, signs, and symptoms, the diffential diagnosis includes but is not limited to meningitis, stroke, sepsis, subarachnoid hemorrhage, intracranial bleeding, encephalitis, vertigo, electrolyte imbalance, and metabolic disorders.    All labs were reviewed and interpreted by me.  CT scan radiology impression was interpreted by me.    MDM  Number of Diagnoses or Management Options  Diagnosis management comments: Patient presented to the emergency department today for dizziness and tinnitus.  CBC notes normal white blood cell count and hemoglobin hematocrit.  CMP notes elevated glucose of 215 and a BUN of 34 but otherwise unremarkable.  Magnesium unremarkable.  CT head had no acute findings.  Patient already on antibiotics for left ear infection but I will refer patient to ENT due to recurrence of tinnitus.       Amount  and/or Complexity of Data Reviewed  Clinical lab tests: reviewed and ordered  Tests in the radiology section of CPT®: reviewed and ordered    Risk of Complications, Morbidity, and/or Mortality  Presenting problems: moderate  Diagnostic procedures: moderate  Management options: low    Patient Progress  Patient progress: stable       Patient Care Considerations:          Consultants/Shared Management Plan:    None    Social Determinants of Health:    Patient is independent, reliable, and has access to care.       Disposition and Care Coordination:    Discharged: The patient is suitable and stable for discharge with no need for consideration of admission.    I have explained the patient´s condition, diagnoses and treatment plan based on the information available to me at this time. I have answered questions and addressed any concerns. The patient has a good  understanding of the patient´s diagnosis, condition, and treatment plan as can be expected at this point. The vital signs have been stable. The patient´s condition is stable and appropriate for discharge from the emergency department.      The patient will pursue further outpatient evaluation with the primary care physician or other designated or consulting physician as outlined in the discharge instructions. They are agreeable to this plan of care and follow-up instructions have been explained in detail. The patient has received these instructions in written format and has expressed an understanding of the discharge instructions. The patient is aware that any significant change in condition or worsening of symptoms should prompt an immediate return to this or the closest emergency department or call to 911.  I have explained discharge medications and the need for follow up with the patient/caretakers. This was also printed in the discharge instructions. Patient was discharged with the following medications and follow up:      Medication List      No changes were  made to your prescriptions during this visit.      No follow-up provider specified.     Final diagnoses:   Tinnitus of left ear        ED Disposition       ED Disposition   Discharge    Condition   Stable    Comment   --               This medical record created using voice recognition software.             Jagdish Benjamin PA-C  06/26/24 2938

## 2024-07-01 ENCOUNTER — TELEPHONE (OUTPATIENT)
Dept: INTERNAL MEDICINE | Age: 59
End: 2024-07-01

## 2024-07-01 NOTE — TELEPHONE ENCOUNTER
Patient called regarding his ENT referral, which was placed by the ER on 06/26/2024.   I called [facility] to see when their first available will be, which is not until December 2024.   Since this is not our referral, can we initiate a new referral to maybe Dr. Melendrez's office? Patient stated that he wants to be seen rather soon.

## 2024-07-01 NOTE — PROGRESS NOTES
Chief Complaint  Annual Exam (The patient is coming in for an annual physical. The patient was seen recently for an earache, he says its gone away. )  Subjective    History of Present Illness  Houston Witt is a 58 y.o. male who presents to [facility] for His annual physical exam and follow-up diabetes and hyperlipidemia. He was on a statin in the past but only taking an herbal supplement for cholesterol now. Seen here recently for left otitis externa, ETD and tinnitus    Follow-up ER visit on 06/26/2024.  Patient presented to the emergency department for dizziness and tinnitus.  CBC notes normal white blood cell count and hemoglobin hematocrit.  CMP notes elevated glucose of 215 and a BUN of 34 but otherwise unremarkable.  Magnesium unremarkable.  CT head had no acute findings.  Patient already on antibiotics for left ear infection but referred patient to ENT due to recurrence of tinnitus.     Patient called regarding his ENT referral, which was placed by the ER. [facility]NT first available December 2024. Patient states he wants to be seen sooner.       Current Outpatient Medications:     glimepiride (Amaryl) 2 MG tablet, Take 1 tablet by mouth Every Morning Before Breakfast., Disp: 90 tablet, Rfl: 1    Ibuprofen 3 %, Gabapentin 10 %, Baclofen 2 %, lidocaine 4 %, Ketamine HCl 4 %, Apply 1-2 g topically to the appropriate area as directed 3 (Three) to 4 (Four) times daily., Disp: 90 g, Rfl: 2    SITagliptin (Januvia) 100 MG tablet, Take 1 tablet by mouth Daily., Disp: 30 tablet, Rfl: 5  No Known Allergies   Past Medical History:   Diagnosis Date    Bronchitis     Diabetes mellitus     Hemorrhoids     Hyperlipidemia 10/03/2023    Recovering alcoholic in remission 10/03/2023    Type 2 diabetes mellitus with hyperglycemia, without long-term current use of insulin 10/03/2023      Past Surgical History:   Procedure Laterality Date    APPENDECTOMY          Objective   /80 (BP  "Location: Left arm, Patient Position: Sitting, Cuff Size: Adult)   Pulse 74   Temp 97.2 °F (36.2 °C) (Temporal)   Ht 185.4 cm (73\")   Wt 74.8 kg (164 lb 12.8 oz)   SpO2 97%   BMI 21.74 kg/m²    Estimated body mass index is 21.74 kg/m² as calculated from the following:    Height as of this encounter: 185.4 cm (73\").    Weight as of this encounter: 74.8 kg (164 lb 12.8 oz).     Physical Exam  Vitals reviewed.   Constitutional:       General: He is not in acute distress.  HENT:      Head: Normocephalic and atraumatic.      Right Ear: Tympanic membrane and ear canal normal.      Left Ear: Tympanic membrane and ear canal normal.   Eyes:      Conjunctiva/sclera: Conjunctivae normal.   Cardiovascular:      Rate and Rhythm: Normal rate and regular rhythm.      Heart sounds: Normal heart sounds. No murmur heard.  Pulmonary:      Effort: Pulmonary effort is normal.      Breath sounds: Normal breath sounds. No wheezing, rhonchi or rales.   Abdominal:      General: There is no distension.      Palpations: Abdomen is soft. There is no mass.      Tenderness: There is no abdominal tenderness.   Musculoskeletal:      Right lower leg: No edema.      Left lower leg: No edema.   Lymphadenopathy:      Cervical: No cervical adenopathy.   Skin:     General: Skin is warm and dry.      Coloration: Skin is not jaundiced or pale.   Neurological:      General: No focal deficit present.      Mental Status: He is alert.   Psychiatric:         Mood and Affect: Mood normal.         Thought Content: Thought content normal.          Result Review :  The following data was reviewed by: SHANNON Hernandez on 07/03/2024:  Common labs          10/3/2023    10:12 10/3/2023    10:13 1/3/2024    09:33 6/26/2024    18:02   Common Labs   Glucose 191   143  215    BUN 18   20  34    Creatinine 0.81   0.93  0.97    Sodium 137   138  136    Potassium 5.3   4.7  4.2    Chloride 100   101  98    Calcium 10.2   9.9  9.3    Albumin 5.0   4.6  4.3    Total " Bilirubin 0.5   0.4  0.3    Alkaline Phosphatase 63   64  61    AST (SGOT) 17   18  19    ALT (SGPT) 15   14  19    WBC 5.90   6.03  7.30    Hemoglobin 15.1   13.8  14.2    Hematocrit 43.9   38.5  39.7    Platelets 317   267  232    Total Cholesterol 163   122     Triglycerides 57   51     HDL Cholesterol 48   41     LDL Cholesterol  104   69     Hemoglobin A1C 7.90   6.70     Microalbumin, Urine  <1.2      PSA 0.275                     Assessment and Plan   Diagnoses and all orders for this visit:    1. Annual physical exam (Primary)  -     Comprehensive Metabolic Panel; Future  -     CBC & Differential; Future  -     Lipid Panel; Future  -     T4, Free; Future  -     TSH; Future    2. Type 2 diabetes mellitus with hyperglycemia, without long-term current use of insulin  -     Comprehensive Metabolic Panel  -     CBC & Differential  -     Hemoglobin A1c  -     Hemoglobin A1c; Future  -     Microalbumin / Creatinine Urine Ratio - Urine, Clean Catch; Future    3. Hyperlipidemia, unspecified hyperlipidemia type  -     Lipid Panel    4. Abnormal TSH  -     T4, Free  -     TSH    5. Tinnitus of left ear    6. Screening for malignant neoplasm of prostate  -     PSA Screen; Future    Other orders  -     SITagliptin (Januvia) 100 MG tablet; Take 1 tablet by mouth Daily.  Dispense: 30 tablet; Refill: 5      Annual exam: Discussed healthy diet, exercise, adequate sleep, cancer screening, immunizations and preventative care. Annual eye exam and twice yearly dental cleaning. Patient refuses all vaccines. He verbalizes understanding of the risk of not taking recommended vaccines.     BMI is within normal parameters. No other follow-up for BMI required.     Diabetes type 2: The patient is taking glimepiride 2 mg daily. He stopped Januvia due to high cost. He has changed insurance. Blood sugar has been 200. Sent in Januvia 100 mg daily and patient will check cost now that he has a different insurance.   Labs done this morning and  will review.      Hyperlipidemia: The patient is currently not on prescription medication, however he uses Corin fruit herbal supplement which claims to help lower cholesterol.       Abnormal TSH: Needs to be rechecked since last level was 10/3/23 and was slightly abnormal. Check labs.     Tinnitus: Improved. No referral needed at this time. Use nasal sinus rinses.     Patient was given instructions and counseling regarding his condition or for health maintenance advice. Please see specific information pulled into the AVS if appropriate.     Follow Up   Return in about 4 months (around 11/4/2024) for Recheck.    Dictated Utilizing Dragon Dictation.  Please note that portions of this note were completed with a voice recognition program.  Part of this note may be an electronic transcription/translation of spoken language to printed text using the Dragon Dictation System.    SHANNON Hernandez

## 2024-07-03 ENCOUNTER — OFFICE VISIT (OUTPATIENT)
Dept: INTERNAL MEDICINE | Age: 59
End: 2024-07-03
Payer: COMMERCIAL

## 2024-07-03 VITALS
DIASTOLIC BLOOD PRESSURE: 80 MMHG | SYSTOLIC BLOOD PRESSURE: 129 MMHG | WEIGHT: 164.8 LBS | OXYGEN SATURATION: 97 % | HEIGHT: 73 IN | HEART RATE: 74 BPM | BODY MASS INDEX: 21.84 KG/M2 | TEMPERATURE: 97.2 F

## 2024-07-03 DIAGNOSIS — H93.12 TINNITUS OF LEFT EAR: ICD-10-CM

## 2024-07-03 DIAGNOSIS — Z00.00 ANNUAL PHYSICAL EXAM: Primary | ICD-10-CM

## 2024-07-03 DIAGNOSIS — Z12.5 SCREENING FOR MALIGNANT NEOPLASM OF PROSTATE: ICD-10-CM

## 2024-07-03 DIAGNOSIS — E11.65 TYPE 2 DIABETES MELLITUS WITH HYPERGLYCEMIA, WITHOUT LONG-TERM CURRENT USE OF INSULIN: Chronic | ICD-10-CM

## 2024-07-03 DIAGNOSIS — E78.5 HYPERLIPIDEMIA, UNSPECIFIED HYPERLIPIDEMIA TYPE: Chronic | ICD-10-CM

## 2024-07-03 DIAGNOSIS — R79.89 ABNORMAL TSH: ICD-10-CM

## 2024-07-03 LAB
ALBUMIN SERPL-MCNC: 4.4 G/DL (ref 3.5–5.2)
ALBUMIN/GLOB SERPL: 1.6 G/DL
ALP SERPL-CCNC: 56 U/L (ref 39–117)
ALT SERPL W P-5'-P-CCNC: 20 U/L (ref 1–41)
ANION GAP SERPL CALCULATED.3IONS-SCNC: 9 MMOL/L (ref 5–15)
AST SERPL-CCNC: 25 U/L (ref 1–40)
BASOPHILS # BLD AUTO: 0.02 10*3/MM3 (ref 0–0.2)
BASOPHILS NFR BLD AUTO: 0.4 % (ref 0–1.5)
BILIRUB SERPL-MCNC: 0.5 MG/DL (ref 0–1.2)
BUN SERPL-MCNC: 28 MG/DL (ref 6–20)
BUN/CREAT SERPL: 29.5 (ref 7–25)
CALCIUM SPEC-SCNC: 10.1 MG/DL (ref 8.6–10.5)
CHLORIDE SERPL-SCNC: 100 MMOL/L (ref 98–107)
CHOLEST SERPL-MCNC: 157 MG/DL (ref 0–200)
CO2 SERPL-SCNC: 28 MMOL/L (ref 22–29)
CREAT SERPL-MCNC: 0.95 MG/DL (ref 0.76–1.27)
DEPRECATED RDW RBC AUTO: 40.8 FL (ref 37–54)
EGFRCR SERPLBLD CKD-EPI 2021: 93.4 ML/MIN/1.73
EOSINOPHIL # BLD AUTO: 0.1 10*3/MM3 (ref 0–0.4)
EOSINOPHIL NFR BLD AUTO: 2 % (ref 0.3–6.2)
ERYTHROCYTE [DISTWIDTH] IN BLOOD BY AUTOMATED COUNT: 12.5 % (ref 12.3–15.4)
GLOBULIN UR ELPH-MCNC: 2.8 GM/DL
GLUCOSE SERPL-MCNC: 180 MG/DL (ref 65–99)
HBA1C MFR BLD: 7.3 % (ref 4.8–5.6)
HCT VFR BLD AUTO: 43.3 % (ref 37.5–51)
HDLC SERPL-MCNC: 43 MG/DL (ref 40–60)
HGB BLD-MCNC: 14.8 G/DL (ref 13–17.7)
IMM GRANULOCYTES # BLD AUTO: 0.01 10*3/MM3 (ref 0–0.05)
IMM GRANULOCYTES NFR BLD AUTO: 0.2 % (ref 0–0.5)
LDLC SERPL CALC-MCNC: 99 MG/DL (ref 0–100)
LDLC/HDLC SERPL: 2.28 {RATIO}
LYMPHOCYTES # BLD AUTO: 0.88 10*3/MM3 (ref 0.7–3.1)
LYMPHOCYTES NFR BLD AUTO: 18 % (ref 19.6–45.3)
MCH RBC QN AUTO: 30.9 PG (ref 26.6–33)
MCHC RBC AUTO-ENTMCNC: 34.2 G/DL (ref 31.5–35.7)
MCV RBC AUTO: 90.4 FL (ref 79–97)
MONOCYTES # BLD AUTO: 0.41 10*3/MM3 (ref 0.1–0.9)
MONOCYTES NFR BLD AUTO: 8.4 % (ref 5–12)
NEUTROPHILS NFR BLD AUTO: 3.46 10*3/MM3 (ref 1.7–7)
NEUTROPHILS NFR BLD AUTO: 71 % (ref 42.7–76)
NRBC BLD AUTO-RTO: 0 /100 WBC (ref 0–0.2)
PLATELET # BLD AUTO: 251 10*3/MM3 (ref 140–450)
PMV BLD AUTO: 9.1 FL (ref 6–12)
POTASSIUM SERPL-SCNC: 4.3 MMOL/L (ref 3.5–5.2)
PROT SERPL-MCNC: 7.2 G/DL (ref 6–8.5)
RBC # BLD AUTO: 4.79 10*6/MM3 (ref 4.14–5.8)
SODIUM SERPL-SCNC: 137 MMOL/L (ref 136–145)
T4 FREE SERPL-MCNC: 1.17 NG/DL (ref 0.92–1.68)
TRIGL SERPL-MCNC: 79 MG/DL (ref 0–150)
TSH SERPL DL<=0.05 MIU/L-ACNC: 5.47 UIU/ML (ref 0.27–4.2)
VLDLC SERPL-MCNC: 15 MG/DL (ref 5–40)
WBC NRBC COR # BLD AUTO: 4.88 10*3/MM3 (ref 3.4–10.8)

## 2024-07-03 PROCEDURE — 83036 HEMOGLOBIN GLYCOSYLATED A1C: CPT | Performed by: NURSE PRACTITIONER

## 2024-07-03 PROCEDURE — 80050 GENERAL HEALTH PANEL: CPT | Performed by: NURSE PRACTITIONER

## 2024-07-03 PROCEDURE — 84439 ASSAY OF FREE THYROXINE: CPT | Performed by: NURSE PRACTITIONER

## 2024-07-03 PROCEDURE — 80061 LIPID PANEL: CPT | Performed by: NURSE PRACTITIONER

## 2024-07-03 PROCEDURE — 99396 PREV VISIT EST AGE 40-64: CPT | Performed by: NURSE PRACTITIONER

## 2024-07-08 ENCOUNTER — TELEPHONE (OUTPATIENT)
Dept: INTERNAL MEDICINE | Age: 59
End: 2024-07-08

## 2024-07-08 NOTE — TELEPHONE ENCOUNTER
Caller: Houston Witt    Relationship: Self    Best call back number: [phone]     Caller requesting test results: SELF    What test was performed: BLOOD WORK    When was the test performed: 7/3/24    Where was the test performed: [facility]

## 2024-08-01 ENCOUNTER — PATIENT MESSAGE (OUTPATIENT)
Dept: INTERNAL MEDICINE | Age: 59
End: 2024-08-01
Payer: COMMERCIAL

## 2024-08-02 RX ORDER — ATORVASTATIN CALCIUM 10 MG/1
10 TABLET, FILM COATED ORAL NIGHTLY
Qty: 90 TABLET | Refills: 3 | Status: SHIPPED | OUTPATIENT
Start: 2024-08-02

## 2024-08-05 NOTE — PRE-PROCEDURE INSTRUCTIONS
"Instructed on date and arrival time of 0900. Instructed that arrival time is not their procedure time but allows time to prepare for procedure.  Come to entrance \"C\". Must have  over age 18 to drive home.  May have two visitors; however, children under 12 must stay in waiting room.  Discussed clear liquid diet (no red or purple), bowel prep, and NPO.  May take medications as usual except for blood thinners, diabetic medications, and weight loss medications.  Verbalized understanding of instructions given.  Instructed to call for questions or concerns.  "

## 2024-08-06 ENCOUNTER — ANESTHESIA EVENT (OUTPATIENT)
Dept: GASTROENTEROLOGY | Facility: HOSPITAL | Age: 59
End: 2024-08-06
Payer: COMMERCIAL

## 2024-08-06 NOTE — H&P
Patient is here to have a colonoscopy.  Following is a copy of the HPI from the patient's office visit at the surgery clinic.      History of Present Illness  Houston Witt is a 57 y.o. male presents to [facility] for colonoscopy consultation.    Patient presents today on referral from Marianna Pardo for colonoscopy consultation.  Patient denies any abdominal pain, change in bowel habit, or rectal bleeding.  Admits to family history of colon cancer with his mother that recently passed.  No previous colonoscopy.    Moviprep    Objective     Past Medical History:   Diagnosis Date    Bronchitis     Diabetes mellitus     Hemorrhoids     Hyperlipidemia 10/03/2023    Recovering alcoholic in remission 10/03/2023    Type 2 diabetes mellitus with hyperglycemia, without long-term current use of insulin 10/03/2023       Past Surgical History:   Procedure Laterality Date    APPENDECTOMY         Outpatient Medications Marked as Taking for the 10/11/23 encounter (Office Visit) with Angeline Castaneda, APRORIN   Medication Sig Dispense Refill    metFORMIN (GLUCOPHAGE) 500 MG tablet Take 1 tablet by mouth 2 (Two) Times a Day With Meals. 180 tablet 1    ondansetron ODT (ZOFRAN-ODT) 4 MG disintegrating tablet Place 1 tablet on the tongue Every 8 (Eight) Hours As Needed for Nausea or Vomiting. 90 tablet 1       No Known Allergies     Family History   Problem Relation Age of Onset    Colon cancer Mother        Social History     Socioeconomic History    Marital status:    Tobacco Use    Smoking status: Never     Passive exposure: Never    Smokeless tobacco: Never   Vaping Use    Vaping Use: Never used   Substance and Sexual Activity    Alcohol use: Never     Comment: IN RECOVER 22 YEARS    Drug use: Never    Sexual activity: Defer       Physical Exam  Vitals - Available in the EMR.   Respiratory:  breathing not labored, respiratory effort appears normal  Cardiovascular:  heart regular rate  Skin  and subcutaneous tissue:  warm and dry  Musculoskeletal: moving all extremities symmetrically and purposefully  Neurologic:  no obvious motor or sensory deficits, speech clear  Psychiatric:  judgment and insight intact, mood normal      Assessment   Screening colonoscopy  Family history of colon cancer    Plan  Colonoscopy    Risks and benefits discussed    Mukul Jones M.D.  08/06/24    Electronically signed by Mukul Jones MD, 08/06/24, 2:23 PM EDT.

## 2024-08-06 NOTE — ANESTHESIA PREPROCEDURE EVALUATION
Anesthesia Evaluation     Patient summary reviewed and Nursing notes reviewed   no history of anesthetic complications:   NPO Solid Status: > 8 hours             Airway   Mallampati: II  TM distance: >3 FB  Neck ROM: full  No difficulty expected  Dental - normal exam     Pulmonary - normal exam   Cardiovascular     Rhythm: regular  Rate: normal    (+) hyperlipidemia      Neuro/Psych  GI/Hepatic/Renal/Endo    (+) liver disease cirrhosis, diabetes mellitus type 2    Musculoskeletal     Abdominal  - normal exam   Substance History   (+) alcohol use (Quit drinking at age 27)     OB/GYN          Other        ROS/Med Hx Other: Screening, fam hx colon CA     Had 4 oz water at 0920 - ready to proceed at 1120                  Anesthesia Plan    ASA 2     general   total IV anesthesia  (Total IV Anesthesia    Patient understands anesthesia not responsible for dental damage.  )  intravenous induction     Anesthetic plan, risks, benefits, and alternatives have been provided, discussed and informed consent has been obtained with: patient.  Pre-procedure education provided  Plan discussed with CRNA.      CODE STATUS:

## 2024-08-07 ENCOUNTER — HOSPITAL ENCOUNTER (OUTPATIENT)
Facility: HOSPITAL | Age: 59
Setting detail: HOSPITAL OUTPATIENT SURGERY
Discharge: HOME | End: 2024-08-07
Attending: SURGERY | Admitting: SURGERY
Payer: COMMERCIAL

## 2024-08-07 ENCOUNTER — ANESTHESIA (OUTPATIENT)
Dept: GASTROENTEROLOGY | Facility: HOSPITAL | Age: 59
End: 2024-08-07
Payer: COMMERCIAL

## 2024-08-07 VITALS
RESPIRATION RATE: 13 BRPM | BODY MASS INDEX: 21.99 KG/M2 | SYSTOLIC BLOOD PRESSURE: 118 MMHG | HEART RATE: 65 BPM | OXYGEN SATURATION: 100 % | WEIGHT: 166.67 LBS | TEMPERATURE: 97 F | DIASTOLIC BLOOD PRESSURE: 81 MMHG

## 2024-08-07 PROBLEM — Z12.11 SCREENING FOR MALIGNANT NEOPLASM OF COLON: Status: RESOLVED | Noted: 2023-10-11 | Resolved: 2024-08-07

## 2024-08-07 PROBLEM — Z80.0 FAMILY HISTORY OF COLON CANCER IN MOTHER: Status: RESOLVED | Noted: 2023-10-11 | Resolved: 2024-08-07

## 2024-08-07 LAB — GLUCOSE BLDC GLUCOMTR-MCNC: 201 MG/DL (ref 70–99)

## 2024-08-07 PROCEDURE — 82948 REAGENT STRIP/BLOOD GLUCOSE: CPT

## 2024-08-07 PROCEDURE — 25810000003 LACTATED RINGERS PER 1000 ML

## 2024-08-07 PROCEDURE — 25010000002 PROPOFOL 10 MG/ML EMULSION

## 2024-08-07 RX ORDER — PROPOFOL 10 MG/ML
VIAL (ML) INTRAVENOUS AS NEEDED
Status: DISCONTINUED | OUTPATIENT
Start: 2024-08-07 | End: 2024-08-07 | Stop reason: SURG

## 2024-08-07 RX ORDER — SODIUM CHLORIDE, SODIUM LACTATE, POTASSIUM CHLORIDE, CALCIUM CHLORIDE 600; 310; 30; 20 MG/100ML; MG/100ML; MG/100ML; MG/100ML
30 INJECTION, SOLUTION INTRAVENOUS CONTINUOUS
Status: DISCONTINUED | OUTPATIENT
Start: 2024-08-07 | End: 2024-08-07 | Stop reason: HOSPADM

## 2024-08-07 RX ORDER — LIDOCAINE HYDROCHLORIDE 20 MG/ML
INJECTION, SOLUTION EPIDURAL; INFILTRATION; INTRACAUDAL; PERINEURAL AS NEEDED
Status: DISCONTINUED | OUTPATIENT
Start: 2024-08-07 | End: 2024-08-07 | Stop reason: SURG

## 2024-08-07 RX ADMIN — LIDOCAINE HYDROCHLORIDE 40 MG: 20 INJECTION, SOLUTION EPIDURAL; INFILTRATION; INTRACAUDAL; PERINEURAL at 11:34

## 2024-08-07 RX ADMIN — SODIUM CHLORIDE, POTASSIUM CHLORIDE, SODIUM LACTATE AND CALCIUM CHLORIDE 30 ML/HR: 600; 310; 30; 20 INJECTION, SOLUTION INTRAVENOUS at 11:25

## 2024-08-07 RX ADMIN — PROPOFOL 100 MG: 10 INJECTION, EMULSION INTRAVENOUS at 11:34

## 2024-08-07 RX ADMIN — PROPOFOL 250 MCG/KG/MIN: 10 INJECTION, EMULSION INTRAVENOUS at 11:34

## 2024-08-07 NOTE — ANESTHESIA POSTPROCEDURE EVALUATION
Patient: Houston Witt    Procedure Summary       Date: 08/07/24 Room / Location: [facility] ENDOSCOPY 1 / [facility] ENDOSCOPY    Anesthesia Start: 1132 Anesthesia Stop: 1204    Procedure: COLONOSCOPY WITH HEMORRHOID BANDING X2 Diagnosis:       Screening for malignant neoplasm of colon      Family history of colon cancer in mother      (Screening for malignant neoplasm of colon [Z12.11])      (Family history of colon cancer in mother [Z80.0])    Surgeons: Mukul Jones MD Provider: Jaime Mueller CRNA    Anesthesia Type: general ASA Status: 2            Anesthesia Type: general    Vitals  Vitals Value Taken Time   /85 08/07/24 1223   Temp 36.1 °C (97 °F) 08/07/24 1217   Pulse 61 08/07/24 1224   Resp 13 08/07/24 1217   SpO2 100 % 08/07/24 1224   Vitals shown include unfiled device data.        Post Anesthesia Care and Evaluation    Post-procedure mental status: acceptable.  Pain management: satisfactory to patient    Airway patency: patent  Anesthetic complications: No anesthetic complications    Cardiovascular status: acceptable  Respiratory status: acceptable    Comments: Per chart review

## 2024-08-19 NOTE — TELEPHONE ENCOUNTER
Is he on the 50 or the 100? It looks like the 100 was taken off in July does josue want him to stay on that or go back to the 50 since he's been off of it?

## 2024-09-29 NOTE — PROGRESS NOTES
Chief Complaint  Diabetes (58 year old male here today to talk about his medications. States his sugars have still be running high and he would like to change his dose. States he has no other concerns. )  Subjective      History of Present Illness  The patient is a 58-year-old male who presents for follow-up of diabetes, hyperlipidemia, and abnormal TSH.    He has been unable to complete his blood work due to time constraints but plans to do so within the next week. He is currently on the highest dose of Januvia (100 mg) for his diabetes, which he reports is more effective than glipizide or metformin. Despite dietary changes, including reducing sugar and carbohydrate intake, his blood sugar levels remain around 200.  He also mentions that he is overdue for an eye exam and does not have an ophthalmologist. His last diabetic eye exam was over two years ago.    He is taking atorvastatin 10 mg daily for his cholesterol management. He expresses concern about his cholesterol level being 200, as he believes this puts him at risk for a heart attack.    His BUN levels were elevated during his last blood work, which he attributes to not drinking water in the morning before the test. He also mentions that he does not receive influenza vaccines as he does not believe in them. He takes a multivitamin daily and maintains good hygiene practices, such as frequent hand washing. He reports no leg swelling.    He went to see a doctor for his feet issue and neuropathy. He was told it was not a blood circulation issue. He started taking natural herbs for circulation, which helped. Previously, he could not stand for more than 45 minutes and had to elevate his feet. Now, his legs tingle and he still has neuropathy, but they do not have that throbbing feeling like they are fatigued.       Past Medical History:   Diagnosis Date    Bronchitis     Diabetes mellitus     Hemorrhoids     Hyperlipidemia 10/03/2023    Recovering alcoholic in  "remission 10/03/2023    Type 2 diabetes mellitus with hyperglycemia, without long-term current use of insulin 10/03/2023        Past Surgical History:   Procedure Laterality Date    APPENDECTOMY      COLONOSCOPY N/A 8/7/2024    Procedure: COLONOSCOPY WITH HEMORRHOID BANDING X2;  Surgeon: Mukul Jones MD;  Location: [facility] ENDOSCOPY;  Service: General;  Laterality: N/A;  HEMORRHOIDS, 1 HEMORRHOIDS BANDED        Social History     Tobacco Use   Smoking Status Never    Passive exposure: Never   Smokeless Tobacco Never        Patient Care Team:  Marianna Pagan APRN as PCP - General (Nurse Practitioner)  Angeline Castaneda APRN as Nurse Practitioner (Nurse Practitioner)    No Known Allergies       Current Outpatient Medications:     atorvastatin (LIPITOR) 10 MG tablet, Take 1 tablet by mouth Every Night., Disp: 90 tablet, Rfl: 3    SITagliptin (Januvia) 100 MG tablet, Take 1 tablet by mouth Daily., Disp: 90 tablet, Rfl: 3    Objective     Vitals:    09/30/24 0819   BP: 118/64   BP Location: Left arm   Patient Position: Sitting   Pulse: 73   Resp: 18   Temp: 97.9 °F (36.6 °C)   TempSrc: Temporal   SpO2: 100%   Weight: 76.6 kg (168 lb 12.8 oz)   Height: 185.4 cm (72.99\")        Wt Readings from Last 3 Encounters:   09/30/24 76.6 kg (168 lb 12.8 oz)   08/07/24 75.6 kg (166 lb 10.7 oz)   07/03/24 74.8 kg (164 lb 12.8 oz)        BP Readings from Last 3 Encounters:   09/30/24 118/64   08/07/24 118/81   07/03/24 129/80        Physical Exam  Vitals reviewed.   Constitutional:       General: He is not in acute distress.  HENT:      Head: Normocephalic and atraumatic.   Cardiovascular:      Rate and Rhythm: Normal rate and regular rhythm.   Pulmonary:      Effort: Pulmonary effort is normal.      Breath sounds: Normal breath sounds. No wheezing, rhonchi or rales.   Musculoskeletal:      Right lower leg: No edema.      Left lower leg: No edema.   Skin:     General: Skin is warm and dry.   Neurological:      General: No focal " deficit present.      Mental Status: He is alert.   Psychiatric:         Thought Content: Thought content normal.          Physical Exam         Result Review   The following data was reviewed by: SHANNON Hernandez on 09/11/2024:  [x]  Tests & Results  []  Hospitalization/Emergency Department/Urgent Care  []  Internal/External Consultant Notes       Assessment and Plan     Diagnoses and all orders for this visit:    1. Type 2 diabetes mellitus with hyperglycemia, without long-term current use of insulin (Primary)    2. Hyperlipidemia, unspecified hyperlipidemia type    3. Abnormal TSH        Assessment & Plan  1. Diabetes Mellitus.  His hemoglobin A1c was recorded as 7.3 on July 3, 2024. He reports that his blood sugar levels are still around 200 despite being on the highest dose of Januvia 100 mg. He was advised that 100 mg is the maximum dose for Januvia and that an additional medication might be needed if blood sugar levels do not improve. He will continue with the current regimen and will undergo blood work next week to monitor his levels. He was also reminded to schedule a diabetic eye exam.    2. Hyperlipidemia.  His cholesterol levels were well-managed during the last assessment, with the bad cholesterol recorded at 99. He was advised to continue his atorvastatin 10 mg regimen once a day. Increasing the dosage was discussed, but it was decided to maintain the current dose to avoid potential side effects such as muscle aches.    3. Elevated TSH.  He will undergo blood work next week to monitor his thyroid levels along with other tests.    4. Health Maintenance.  He was advised to hydrate adequately before his blood work to improve the accuracy of the results and ease the blood draw process. He declined the COVID-19 vaccine but agreed to receive the shingles vaccine, which he can get at a pharmacy like [facility]. He was informed that the shingles vaccine, Shingrix, requires a second dose after 2  months.    Follow-up  Return in 6 months for follow up.      BMI is within normal parameters. No other follow-up for BMI required.     Houston Witt  reports that he has never smoked. He has never been exposed to tobacco smoke. He has never used smokeless tobacco.     Patient was given instructions and counseling regarding his condition or for health maintenance advice. Please see specific information pulled into the AVS if appropriate.     Follow Up   Return in about 6 months (around 3/30/2025) for Recheck.    Please note that portions of this documentation were completed with a voice recognition program.    Patient or patient representative verbalized consent for the use of Ambient Listening during the visit with  SHANNON Hernandez for chart documentation. 9/30/2024  08:49 EDT    SHANNON Hernandez

## 2024-09-30 ENCOUNTER — OFFICE VISIT (OUTPATIENT)
Dept: INTERNAL MEDICINE | Age: 59
End: 2024-09-30
Payer: COMMERCIAL

## 2024-09-30 VITALS
RESPIRATION RATE: 18 BRPM | HEIGHT: 73 IN | SYSTOLIC BLOOD PRESSURE: 118 MMHG | TEMPERATURE: 97.9 F | HEART RATE: 73 BPM | DIASTOLIC BLOOD PRESSURE: 64 MMHG | BODY MASS INDEX: 22.37 KG/M2 | OXYGEN SATURATION: 100 % | WEIGHT: 168.8 LBS

## 2024-09-30 DIAGNOSIS — E11.65 TYPE 2 DIABETES MELLITUS WITH HYPERGLYCEMIA, WITHOUT LONG-TERM CURRENT USE OF INSULIN: Primary | ICD-10-CM

## 2024-09-30 DIAGNOSIS — E78.5 HYPERLIPIDEMIA, UNSPECIFIED HYPERLIPIDEMIA TYPE: ICD-10-CM

## 2024-09-30 DIAGNOSIS — R79.89 ABNORMAL TSH: ICD-10-CM

## 2024-09-30 PROCEDURE — 99214 OFFICE O/P EST MOD 30 MIN: CPT | Performed by: NURSE PRACTITIONER

## 2024-10-01 RX ORDER — GLIMEPIRIDE 2 MG/1
2 TABLET ORAL
Qty: 90 TABLET | Refills: 0 | OUTPATIENT
Start: 2024-10-01

## 2024-11-06 ENCOUNTER — PATIENT MESSAGE (OUTPATIENT)
Dept: INTERNAL MEDICINE | Age: 59
End: 2024-11-06
Payer: COMMERCIAL

## 2024-11-06 ENCOUNTER — TELEPHONE (OUTPATIENT)
Dept: INTERNAL MEDICINE | Age: 59
End: 2024-11-06

## 2024-11-06 NOTE — TELEPHONE ENCOUNTER
Caller: Houston Witt    Relationship: Self    Best call back number: [phone]     What was the call regarding: PATIENT STATES THAT HE NEEDS A PRIOR AUTHORIZATION COMPLETED FOR HIS SITagliptin (Januvia) 100 MG tablet  THROUGH HIS [facility] INSURANCE.     PATIENT WOULD LIKE A Prevently MESSAGE OR TEXT WHEN THIS IS COMPLETE.

## 2024-12-04 DIAGNOSIS — E11.65 TYPE 2 DIABETES MELLITUS WITH HYPERGLYCEMIA, WITHOUT LONG-TERM CURRENT USE OF INSULIN: Primary | ICD-10-CM

## 2025-01-30 ENCOUNTER — TELEPHONE (OUTPATIENT)
Dept: INTERNAL MEDICINE | Age: 60
End: 2025-01-30
Payer: COMMERCIAL

## 2025-01-30 DIAGNOSIS — K64.9 HEMORRHOIDS, UNSPECIFIED HEMORRHOID TYPE: Primary | ICD-10-CM

## 2025-01-30 NOTE — TELEPHONE ENCOUNTER
Caller: Houston Witt    Relationship to patient: Self    Best call back number: [phone]     Patient is needing: PATIENT IS REQUESTING A CALL BACK. HE STATED THAT HE HAS A HEMORRHOIDS FLARE UP NAD IT'S VERY PAINFUL. PLEASE CALL AND ADVISE.

## 2025-02-03 ENCOUNTER — OFFICE VISIT (OUTPATIENT)
Dept: SURGERY | Facility: CLINIC | Age: 60
End: 2025-02-03
Payer: COMMERCIAL

## 2025-02-03 VITALS
SYSTOLIC BLOOD PRESSURE: 138 MMHG | DIASTOLIC BLOOD PRESSURE: 90 MMHG | BODY MASS INDEX: 22.07 KG/M2 | WEIGHT: 166.5 LBS | HEART RATE: 96 BPM | HEIGHT: 73 IN

## 2025-02-03 DIAGNOSIS — K64.9 HEMORRHOIDS, UNSPECIFIED HEMORRHOID TYPE: Primary | ICD-10-CM

## 2025-02-03 PROCEDURE — 99212 OFFICE O/P EST SF 10 MIN: CPT | Performed by: SURGERY

## 2025-02-03 RX ORDER — HYDROCORTISONE 25 MG/G
1 CREAM TOPICAL 4 TIMES DAILY PRN
Qty: 20 G | Refills: 1 | Status: SHIPPED | OUTPATIENT
Start: 2025-02-03 | End: 2025-02-17

## 2025-02-03 RX ORDER — LIDOCAINE 50 MG/G
1 OINTMENT TOPICAL
Qty: 30 G | Refills: 1 | Status: SHIPPED | OUTPATIENT
Start: 2025-02-03

## 2025-02-03 RX ORDER — PRAMOXINE HYDROCHLORIDE 10 MG/G
AEROSOL, FOAM TOPICAL EVERY 4 HOURS PRN
Qty: 15 G | Refills: 1 | Status: SHIPPED | OUTPATIENT
Start: 2025-02-03 | End: 2025-02-17

## 2025-02-03 NOTE — PROGRESS NOTES
"Chief Complaint:  Follow-up and Hemorrhoids    Primary Care Provider: Marianna Pagan APRN    Referring Provider: Marianna Pagan APRN    History of Present Illness  Houston Witt is a 59 y.o. male referred by SHANNON Hernandez hemorrhoids.  The patient has a hemorrhoid that he thinks is bulging out from the inside of his anus.  He had quite a bit of swelling and pain at the area over the past week or so but the pain and swelling is getting better now.  Patient says when he lived in [place] over 10 years ago that he had a hemorrhoid surgery and they removed \"many large hemorrhoids.\"    Colonoscopy was done on 8/7/2024.  There were small internal hemorrhoids seen.  2 hemorrhoid bands were placed at the left lateral position.  The colonoscopy findings were otherwise normal.      Allergies: Patient has no known allergies.    Outpatient Medications Marked as Taking for the 2/3/25 encounter (Office Visit) with Mukul Jones MD   Medication Sig Dispense Refill    atorvastatin (LIPITOR) 10 MG tablet Take 1 tablet by mouth Every Night. 90 tablet 3    SITagliptin (Januvia) 100 MG tablet Take 1 tablet by mouth Daily. 90 tablet 3       Past Medical History:    Bronchitis    Clotting disorder    Colon polyp    Diabetes mellitus    Hemorrhoids    Hyperlipidemia    Recovering alcoholic in remission    Rectal bleeding    Substance abuse    Type 2 diabetes mellitus with hyperglycemia, without long-term current use of insulin        Past Surgical History:    APPENDECTOMY    COLONOSCOPY    Procedure: COLONOSCOPY WITH HEMORRHOID BANDING X2;  Surgeon: Mukul Jones MD;  Location: [facility] ENDOSCOPY;  Service: General;  Laterality: N/A;  HEMORRHOIDS, 1 HEMORRHOIDS BANDED    HEMORRHOIDECTOMY       Family History:   Family History   Problem Relation Age of Onset    Colon cancer Mother     Cancer Mother     Arthritis Maternal Grandmother     Alcohol abuse Maternal Uncle     Heart disease Paternal Uncle         Social " "History:  Social History     Tobacco Use    Smoking status: Never     Passive exposure: Never    Smokeless tobacco: Never   Substance Use Topics    Alcohol use: Yes     Alcohol/week: 24.0 standard drinks of alcohol     Types: 24 Cans of beer per week     Comment: 6 days a week, every week       Objective     Vital Signs:  /90 (BP Location: Left arm, Patient Position: Sitting, Cuff Size: Adult)   Pulse 96   Ht 185.4 cm (72.99\")   Wt 75.5 kg (166 lb 8 oz)   BMI 21.97 kg/m²   Respiratory:  breathing not labored, respiratory effort appears normal  Cardiovascular:  heart regular rate  Musculoskeletal: moving all extremities symmetrically and purposefully  Neurologic:  no obvious motor or sensory deficits, speech clear  Anal:  soft, mildly tender external hemorrhoid at the right lateral posterior anus  Here alone      Assessment:  External hemorrhoid - small and not thrombosed    Plan:  -Pramoxine HCl, Perianal, 1 % foam; Insert  into the rectum Every 4 (Four) Hours As Needed for Hemorrhoids for up to 14 days.  Dispense: 15 g; Refill: 1  -hydrocortisone 2.5 % cream; Apply 1 Application topically to the appropriate area as directed 4 (Four) Times a Day As Needed (HEMORRHOIDS) for up to 14 days.  Dispense: 20 g; Refill: 1  -lidocaine (XYLOCAINE) 5 %ointment; Apply 1 Application topically to the appropriate area as directed Every 2 Hours As Needed for Mild Pain.  Dispense: 30 g; Refill: 1    F/U with me PRN.      Mukul Jones MD  02/03/2025    Electronically signed by Mukul Jones MD, 02/03/25, 5:45 PM EST.        "

## 2025-03-27 NOTE — PROGRESS NOTES
Answers submitted by the patient for this visit:  Anxiety (Submitted on 3/27/2025)  Chief Complaint: Anxiety  Visit: initial  Onset: 6 to 12 months ago  Progression since onset: worse  Frequency: constantly  Severity: moderate  depressed mood: Yes  dizziness: Yes  excessive worry: Yes  malaise/fatigue: Yes  shortness of breath: Yes  Sleep quality: fair  Hours of sleep per night: 7 Hours  Aggravated by: family issues, work stress      Chief Complaint  Anxiety (59 year old male here today for a follow up on his anxiety. Work is one of his stressors. )    Subjective      History of Present Illness  The patient is a 59-year-old male who presents for evaluation of anxiety and depression.    He reports a recurrence of symptoms similar to those experienced during his first marriage, which he attributes to increased stress at work. He describes difficulty in managing anger, often feeling on the verge of an outburst, and notes that his work-related stress spills over into his home life, affecting his mood. Despite maintaining a generally positive demeanor at home, he expresses concern about potential daily arguments with his wife. He also struggles with controlling his anger towards his 3-year-old daughter, particularly when she cries, leading to instances of yelling. He recalls similar behavior towards his son, which was a contributing factor to his initial medication use. He is unable to recall the specific medication he was previously prescribed but notes that it was effective. He believes that his current work environment, which he describes as unsatisfactory, is a significant contributor to his anxiety. 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 He attempts to manage his stress by isolating himself from others, often leaving the building or going for a walk. He also reports feeling overwhelmed by multiple simultaneous  demands, such as phone calls from vendors and text messages from his boss, which trigger his anxiety. He finds watching TV relaxing and enjoys reading. He was previously on medication for anxiety approximately 6 years ago, which he discontinued without medical consultation after a period of stability.    He has been experiencing recent episodes of depression, accompanied by anxiety, and has a history of suicidal ideation. He reports experiencing a few suicidal thoughts this month but has not formulated any plans. He associates these thoughts with his past alcohol use, noting that they were a daily occurrence when he was drinking. He reports feeling very anxious and struggling to calm himself. He also mentions that he has been feeling tired and has lost weight, dropping from 180 to 160 pounds. He recalls experiencing side effects such as dry mouth and sexual dysfunction with his previous medication, which resolved over time. He expresses interest in trying Lexapro.    He is currently taking Januvia and atorvastatin and has not yet completed his blood work. He plans to do so next week. He has been mindful of his diet, focusing on high protein and low carbohydrate intake, and reports feeling physically better. He has noticed an improvement in the bags under his eyes since switching to whole wheat bread and reducing his carbohydrate intake. He has a lot of energy but notes that his mood can be affected by his depression. He avoids foods high in cholesterol, such as eggs, rollins, French fries, and fast food, and supplements his diet with protein shakes and vitamins. He admits to not consuming enough vegetables.    He declines the pneumonia vaccine but expresses interest in the shingles vaccine.    SOCIAL HISTORY  He admits to a history of alcoholism but has stopped drinking.    MEDICATIONS  Current: Januvia, atorvastatin    IMMUNIZATIONS  He declined the pneumonia vaccine.       Past Medical History:   Diagnosis Date     "Bronchitis     Clotting disorder 2018    Colon polyp     Diabetes mellitus     Hemorrhoids     Hyperlipidemia 10/03/2023    Recovering alcoholic in remission 10/03/2023    Rectal bleeding     Substance abuse 1993    Type 2 diabetes mellitus with hyperglycemia, without long-term current use of insulin 10/03/2023        Past Surgical History:   Procedure Laterality Date    APPENDECTOMY      COLONOSCOPY N/A 08/07/2024    Procedure: COLONOSCOPY WITH HEMORRHOID BANDING X2;  Surgeon: Mukul Jones MD;  Location: [facility] END[facility];  Service: General;  Laterality: N/A;  HEMORRHOIDS, 1 HEMORRHOIDS BANDED    HEMORRHOIDECTOMY          Social History     Tobacco Use   Smoking Status Never    Passive exposure: Never   Smokeless Tobacco Never        Patient Care Team:  Marianna Pagan APRN as PCP - General (Nurse Practitioner)  Leonelck, April, SHANNON as Nurse Practitioner (Nurse Practitioner)  Luisito Perez DPM as Consulting Physician (Podiatry)    No Known Allergies       Current Outpatient Medications:     atorvastatin (LIPITOR) 10 MG tablet, Take 1 tablet by mouth Every Night., Disp: 90 tablet, Rfl: 3    SITagliptin (Januvia) 100 MG tablet, Take 1 tablet by mouth Daily., Disp: 90 tablet, Rfl: 3    escitalopram (Lexapro) 10 MG tablet, Take 1 tablet by mouth Daily., Disp: 30 tablet, Rfl: 1    Objective     Vitals:    03/28/25 0848   BP: 120/70   BP Location: Left arm   Patient Position: Sitting   Cuff Size: Large Adult   Pulse: 67   Resp: 16   Temp: 98.2 °F (36.8 °C)   TempSrc: Temporal   SpO2: 95%   Weight: 74.2 kg (163 lb 9.6 oz)   Height: 185.4 cm (73\")        Wt Readings from Last 3 Encounters:   03/28/25 74.2 kg (163 lb 9.6 oz)   02/03/25 75.5 kg (166 lb 8 oz)   09/30/24 76.6 kg (168 lb 12.8 oz)        BP Readings from Last 3 Encounters:   03/28/25 120/70   02/03/25 138/90   09/30/24 118/64        Physical Exam  Vital Signs  Blood pressure is normal. Weight is 163.    Physical Exam  Vitals reviewed.   Constitutional:  "      General: He is not in acute distress.  HENT:      Head: Normocephalic and atraumatic.   Pulmonary:      Effort: Pulmonary effort is normal.   Neurological:      General: No focal deficit present.      Mental Status: He is alert.   Psychiatric:         Attention and Perception: Attention normal.         Mood and Affect: Mood normal.         Speech: Speech normal.         Behavior: Behavior normal.         Thought Content: Thought content normal. Thought content includes suicidal ideation. Thought content does not include suicidal plan.         Cognition and Memory: Cognition normal.                Result Review   The following data was reviewed by: SHANNON Hernandez on 03/28/2025:  [x]  Tests & Results  []  Hospitalization/Emergency Department/Urgent Care  []  Internal/External Consultant Notes       Assessment and Plan     Diagnoses and all orders for this visit:    1. Anxiety (Primary)    2. Depression, unspecified depression type    3. Type 2 diabetes mellitus with hyperglycemia, without long-term current use of insulin    4. Hyperlipidemia, unspecified hyperlipidemia type    5. Abnormal TSH    Other orders  -     escitalopram (Lexapro) 10 MG tablet; Take 1 tablet by mouth Daily.  Dispense: 30 tablet; Refill: 1         Assessment & Plan  1. Anxiety.  He reports increased anxiety symptoms, including difficulty controlling anger and stress at work. A prescription for Lexapro 10 mg has been issued, with the option to increase the dosage to 20 mg if necessary. He is advised to engage in activities such as reading and puzzle-solving to help manage anxiety. If symptoms persist or worsen, he may increase the dosage to 20 mg after two weeks.    2. Depression.  He reports experiencing depressive symptoms, including a history of suicidal thoughts. His TSH levels were previously elevated, which could contribute to his depression and anxiety. A prescription for Lexapro 10 mg has been issued, with the option to  increase the dosage to 20 mg if necessary. He is advised to contact the crisis hotline if he experiences suicidal thoughts. A comprehensive panel of laboratory tests has been ordered to monitor his condition.    3. Diabetes Mellitus.  He is currently taking Januvia and has been managing his diet by reducing carbohydrate intake and increasing protein consumption. He is advised to continue his current medication regimen and dietary practices. A comprehensive panel of laboratory tests has been ordered to monitor his diabetes.    4. Hyperlipidemia.  He is currently taking atorvastatin and reports that his cholesterol levels were within the normal range during the last check. He is advised to continue his current medication regimen. A comprehensive panel of laboratory tests has been ordered to monitor his cholesterol levels.    5. Health Maintenance.  He is advised to receive the shingles vaccine at his pharmacy.    Follow-up  The patient will follow up in 3 weeks.    BMI is within normal parameters. No other follow-up for BMI required.     Patient was given instructions and counseling regarding his condition or for health maintenance advice. Please see specific information pulled into the AVS if appropriate.     Follow Up   Return in about 3 weeks (around 4/18/2025) for Recheck.    Patient or patient representative verbalized consent for the use of Ambient Listening during the visit with  SHANNON Hernandez for chart documentation. 3/28/2025  09:09 EDT    SHANNON Hernandez

## 2025-03-28 ENCOUNTER — OFFICE VISIT (OUTPATIENT)
Dept: INTERNAL MEDICINE | Age: 60
End: 2025-03-28
Payer: COMMERCIAL

## 2025-03-28 VITALS
OXYGEN SATURATION: 95 % | DIASTOLIC BLOOD PRESSURE: 70 MMHG | BODY MASS INDEX: 21.68 KG/M2 | HEIGHT: 73 IN | RESPIRATION RATE: 16 BRPM | TEMPERATURE: 98.2 F | WEIGHT: 163.6 LBS | HEART RATE: 67 BPM | SYSTOLIC BLOOD PRESSURE: 120 MMHG

## 2025-03-28 DIAGNOSIS — E78.5 HYPERLIPIDEMIA, UNSPECIFIED HYPERLIPIDEMIA TYPE: ICD-10-CM

## 2025-03-28 DIAGNOSIS — E11.65 TYPE 2 DIABETES MELLITUS WITH HYPERGLYCEMIA, WITHOUT LONG-TERM CURRENT USE OF INSULIN: ICD-10-CM

## 2025-03-28 DIAGNOSIS — F41.9 ANXIETY: Primary | ICD-10-CM

## 2025-03-28 DIAGNOSIS — F32.A DEPRESSION, UNSPECIFIED DEPRESSION TYPE: ICD-10-CM

## 2025-03-28 DIAGNOSIS — R79.89 ABNORMAL TSH: ICD-10-CM

## 2025-03-28 PROCEDURE — 99214 OFFICE O/P EST MOD 30 MIN: CPT | Performed by: NURSE PRACTITIONER

## 2025-03-28 RX ORDER — ESCITALOPRAM OXALATE 10 MG/1
10 TABLET ORAL DAILY
Qty: 30 TABLET | Refills: 1 | Status: SHIPPED | OUTPATIENT
Start: 2025-03-28

## 2025-04-22 RX ORDER — ESCITALOPRAM OXALATE 10 MG/1
10 TABLET ORAL DAILY
Qty: 30 TABLET | Refills: 1 | Status: SHIPPED | OUTPATIENT
Start: 2025-04-22

## 2025-04-22 NOTE — TELEPHONE ENCOUNTER
Caller: Houston Witt    Relationship: Self    Best call back number: [phone]     Requested Prescriptions:   Requested Prescriptions     Pending Prescriptions Disp Refills    escitalopram (Lexapro) 10 MG tablet 30 tablet 1     Sig: Take 1 tablet by mouth Daily.        [address]arm[address] [facility] - [phone]  - [phone] FX     Last office visit with prescribing clinician: 3/28/2025   Last telemedicine visit with prescribing clinician: Visit date not found   Next office visit with prescribing clinician: 4/30/2025     Additional details provided by patient:     Does the patient have less than a 3 day supply:  [] Yes  [] No    Would you like a call back once the refill request has been completed: [] Yes [] No    If the office needs to give you a call back, can they leave a voicemail: [] Yes [] No    Ryann Madison Rep   04/22/25 13:09 EDT

## 2025-04-30 ENCOUNTER — TELEPHONE (OUTPATIENT)
Dept: INTERNAL MEDICINE | Age: 60
End: 2025-04-30

## 2025-04-30 NOTE — TELEPHONE ENCOUNTER
LVM to reschedule patients missed app[facility] today [facility]-25 [facility]

## 2025-05-07 ENCOUNTER — OFFICE VISIT (OUTPATIENT)
Dept: INTERNAL MEDICINE | Age: 60
End: 2025-05-07
Payer: COMMERCIAL

## 2025-05-07 VITALS
HEART RATE: 84 BPM | WEIGHT: 161.4 LBS | HEIGHT: 73 IN | SYSTOLIC BLOOD PRESSURE: 116 MMHG | RESPIRATION RATE: 18 BRPM | OXYGEN SATURATION: 97 % | TEMPERATURE: 97.7 F | DIASTOLIC BLOOD PRESSURE: 68 MMHG | BODY MASS INDEX: 21.39 KG/M2

## 2025-05-07 DIAGNOSIS — R79.89 ABNORMAL TSH: ICD-10-CM

## 2025-05-07 DIAGNOSIS — F41.9 ANXIETY AND DEPRESSION: Primary | ICD-10-CM

## 2025-05-07 DIAGNOSIS — Z00.00 ANNUAL PHYSICAL EXAM: ICD-10-CM

## 2025-05-07 DIAGNOSIS — F32.A ANXIETY AND DEPRESSION: Primary | ICD-10-CM

## 2025-05-07 DIAGNOSIS — Z12.5 SCREENING FOR MALIGNANT NEOPLASM OF PROSTATE: ICD-10-CM

## 2025-05-07 DIAGNOSIS — E11.65 TYPE 2 DIABETES MELLITUS WITH HYPERGLYCEMIA, WITHOUT LONG-TERM CURRENT USE OF INSULIN: ICD-10-CM

## 2025-05-07 DIAGNOSIS — E78.5 HYPERLIPIDEMIA, UNSPECIFIED HYPERLIPIDEMIA TYPE: ICD-10-CM

## 2025-05-07 LAB
ALBUMIN SERPL-MCNC: 4.5 G/DL (ref 3.5–5.2)
ALBUMIN UR-MCNC: <1.2 MG/DL
ALBUMIN/GLOB SERPL: 1.8 G/DL
ALP SERPL-CCNC: 61 U/L (ref 39–117)
ALT SERPL W P-5'-P-CCNC: 21 U/L (ref 1–41)
ANION GAP SERPL CALCULATED.3IONS-SCNC: 10.1 MMOL/L (ref 5–15)
AST SERPL-CCNC: 27 U/L (ref 1–40)
BASOPHILS # BLD AUTO: 0.03 10*3/MM3 (ref 0–0.2)
BASOPHILS NFR BLD AUTO: 0.5 % (ref 0–1.5)
BILIRUB SERPL-MCNC: 0.4 MG/DL (ref 0–1.2)
BUN SERPL-MCNC: 21 MG/DL (ref 6–20)
BUN/CREAT SERPL: 24.4 (ref 7–25)
CALCIUM SPEC-SCNC: 9.5 MG/DL (ref 8.6–10.5)
CHLORIDE SERPL-SCNC: 98 MMOL/L (ref 98–107)
CHOLEST SERPL-MCNC: 108 MG/DL (ref 0–200)
CO2 SERPL-SCNC: 27.9 MMOL/L (ref 22–29)
CREAT SERPL-MCNC: 0.86 MG/DL (ref 0.76–1.27)
CREAT UR-MCNC: 116 MG/DL
DEPRECATED RDW RBC AUTO: 41.6 FL (ref 37–54)
EGFRCR SERPLBLD CKD-EPI 2021: 100.4 ML/MIN/1.73
EOSINOPHIL # BLD AUTO: 0.12 10*3/MM3 (ref 0–0.4)
EOSINOPHIL NFR BLD AUTO: 2.1 % (ref 0.3–6.2)
ERYTHROCYTE [DISTWIDTH] IN BLOOD BY AUTOMATED COUNT: 13 % (ref 12.3–15.4)
GLOBULIN UR ELPH-MCNC: 2.5 GM/DL
GLUCOSE SERPL-MCNC: 176 MG/DL (ref 65–99)
HBA1C MFR BLD: 8.4 % (ref 4.8–5.6)
HCT VFR BLD AUTO: 41.2 % (ref 37.5–51)
HDLC SERPL-MCNC: 38 MG/DL (ref 40–60)
HGB BLD-MCNC: 14.7 G/DL (ref 13–17.7)
IMM GRANULOCYTES # BLD AUTO: 0.02 10*3/MM3 (ref 0–0.05)
IMM GRANULOCYTES NFR BLD AUTO: 0.4 % (ref 0–0.5)
LDLC SERPL CALC-MCNC: 59 MG/DL (ref 0–100)
LDLC/HDLC SERPL: 1.61 {RATIO}
LYMPHOCYTES # BLD AUTO: 1.13 10*3/MM3 (ref 0.7–3.1)
LYMPHOCYTES NFR BLD AUTO: 19.9 % (ref 19.6–45.3)
MCH RBC QN AUTO: 31.5 PG (ref 26.6–33)
MCHC RBC AUTO-ENTMCNC: 35.7 G/DL (ref 31.5–35.7)
MCV RBC AUTO: 88.2 FL (ref 79–97)
MICROALBUMIN/CREAT UR: NORMAL MG/G{CREAT}
MONOCYTES # BLD AUTO: 0.44 10*3/MM3 (ref 0.1–0.9)
MONOCYTES NFR BLD AUTO: 7.8 % (ref 5–12)
NEUTROPHILS NFR BLD AUTO: 3.93 10*3/MM3 (ref 1.7–7)
NEUTROPHILS NFR BLD AUTO: 69.3 % (ref 42.7–76)
NRBC BLD AUTO-RTO: 0 /100 WBC (ref 0–0.2)
PLATELET # BLD AUTO: 256 10*3/MM3 (ref 140–450)
PMV BLD AUTO: 9 FL (ref 6–12)
POTASSIUM SERPL-SCNC: 4.5 MMOL/L (ref 3.5–5.2)
PROT SERPL-MCNC: 7 G/DL (ref 6–8.5)
PSA SERPL-MCNC: 0.24 NG/ML (ref 0–4)
RBC # BLD AUTO: 4.67 10*6/MM3 (ref 4.14–5.8)
SODIUM SERPL-SCNC: 136 MMOL/L (ref 136–145)
T4 FREE SERPL-MCNC: 1.22 NG/DL (ref 0.92–1.68)
TRIGL SERPL-MCNC: 44 MG/DL (ref 0–150)
TSH SERPL DL<=0.05 MIU/L-ACNC: 5.8 UIU/ML (ref 0.27–4.2)
VLDLC SERPL-MCNC: 11 MG/DL (ref 5–40)
WBC NRBC COR # BLD AUTO: 5.67 10*3/MM3 (ref 3.4–10.8)

## 2025-05-07 PROCEDURE — 82043 UR ALBUMIN QUANTITATIVE: CPT | Performed by: NURSE PRACTITIONER

## 2025-05-07 PROCEDURE — G0103 PSA SCREENING: HCPCS | Performed by: NURSE PRACTITIONER

## 2025-05-07 PROCEDURE — 84439 ASSAY OF FREE THYROXINE: CPT | Performed by: NURSE PRACTITIONER

## 2025-05-07 PROCEDURE — 84443 ASSAY THYROID STIM HORMONE: CPT | Performed by: NURSE PRACTITIONER

## 2025-05-07 PROCEDURE — 82570 ASSAY OF URINE CREATININE: CPT | Performed by: NURSE PRACTITIONER

## 2025-05-07 PROCEDURE — 80061 LIPID PANEL: CPT | Performed by: NURSE PRACTITIONER

## 2025-05-07 PROCEDURE — 80053 COMPREHEN METABOLIC PANEL: CPT | Performed by: NURSE PRACTITIONER

## 2025-05-07 PROCEDURE — 83036 HEMOGLOBIN GLYCOSYLATED A1C: CPT | Performed by: NURSE PRACTITIONER

## 2025-05-07 PROCEDURE — 85025 COMPLETE CBC W/AUTO DIFF WBC: CPT | Performed by: NURSE PRACTITIONER

## 2025-05-07 RX ORDER — GLIMEPIRIDE 2 MG/1
2 TABLET ORAL
COMMUNITY
End: 2025-05-07 | Stop reason: SDUPTHER

## 2025-05-07 RX ORDER — LISINOPRIL 2.5 MG/1
2.5 TABLET ORAL DAILY
Qty: 90 TABLET | Refills: 3 | Status: SHIPPED | OUTPATIENT
Start: 2025-05-07

## 2025-05-07 RX ORDER — ATORVASTATIN CALCIUM 10 MG/1
10 TABLET, FILM COATED ORAL NIGHTLY
Qty: 90 TABLET | Refills: 3 | Status: SHIPPED | OUTPATIENT
Start: 2025-05-07

## 2025-05-07 RX ORDER — GLIMEPIRIDE 2 MG/1
2 TABLET ORAL
Qty: 30 TABLET | Refills: 2 | Status: SHIPPED | OUTPATIENT
Start: 2025-05-07

## 2025-05-07 RX ORDER — HYDROXYZINE HYDROCHLORIDE 10 MG/1
TABLET, FILM COATED ORAL
Qty: 30 TABLET | Refills: 5 | Status: SHIPPED | OUTPATIENT
Start: 2025-05-07

## 2025-05-07 RX ORDER — ESCITALOPRAM OXALATE 10 MG/1
10 TABLET ORAL DAILY
Qty: 90 TABLET | Refills: 3 | Status: SHIPPED | OUTPATIENT
Start: 2025-05-07

## 2025-05-07 NOTE — PROGRESS NOTES
Chief Complaint  Medication review  (59 year old male here today for a follow up on his lexapro. Needs lab review )    Subjective      History of Present Illness  The patient is a 59-year-old male who presents for evaluation of anxiety, diabetes mellitus, and elevated thyroid levels.    He reports an improvement in his condition, attributing it to the efficacy of his current medication regimen. He has been on Lexapro 10 mg for approximately 3 to 4 weeks, which he believes is beneficial. However, he expresses concern about potential side effects of increasing the dosage to 20 mg, particularly the possibility of emotional numbness. He also notes that the medication induces fatigue, leading him to adjust his administration time from morning to afternoon. Despite these concerns, he is willing to trial the increased dosage. He has a history of alcohol and drug abuse and is currently in recovery. He reports no recent depressive symptoms but acknowledges a persistent increase in appetite.    He underwent blood work this morning, including a thyroid panel, and is hopeful for stable results. He questions whether anxiety could potentially disrupt thyroid function. He has not been tested for Hashimoto's thyroiditis. He has experienced weight loss and maintains a high metabolic rate, even when consuming a carbohydrate-rich diet. He recalls that his thyroid levels were not elevated during his first marriage when he was diagnosed with diabetes.    He has not monitored his blood glucose levels at home for several weeks due to a lack of testing supplies. His last recorded blood glucose level was around 200. He is currently on Januvia, which he takes in the morning before meals. He has discontinued metformin due to constipation and hemorrhoid issues. He supplements his diet with protein shakes made with sucralose, containing zero sugar in the morning and 2 g of sugar in the afternoon. He occasionally consumes carbohydrates, which  may elevate his blood glucose levels.    He continues to take atorvastatin 10 mg at night before bedtime.    He reports a long-standing issue with snoring, which his wife describes as severe enough to cause choking and cessation of breathing during sleep.    SOCIAL HISTORY  The patient is a recovering alcoholic and drug addict.       Past Medical History:   Diagnosis Date    Bronchitis     Clotting disorder 2018    Colon polyp     Diabetes mellitus     Hemorrhoids     Hyperlipidemia 10/03/2023    Recovering alcoholic in remission 10/03/2023    Rectal bleeding     Substance abuse 1993    Type 2 diabetes mellitus with hyperglycemia, without long-term current use of insulin 10/03/2023        Past Surgical History:   Procedure Laterality Date    APPENDECTOMY      COLONOSCOPY N/A 08/07/2024    Procedure: COLONOSCOPY WITH HEMORRHOID BANDING X2;  Surgeon: Mukul Jones MD;  Location: [facility] ENDOSCOPY;  Service: General;  Laterality: N/A;  HEMORRHOIDS, 1 HEMORRHOIDS BANDED    HEMORRHOIDECTOMY          Social History     Tobacco Use   Smoking Status Never    Passive exposure: Never   Smokeless Tobacco Never        Patient Care Team:  Marianna Pagan APRN as PCP - General (Nurse Practitioner)  Angeline Castaneda APRN as Nurse Practitioner (Nurse Practitioner)  Luisito Perez DPM as Consulting Physician (Podiatry)  Mukul Jones MD as Consulting Physician (General Surgery)  Toan Romero MD as Consulting Physician (Urology)    No Known Allergies       Current Outpatient Medications:     atorvastatin (LIPITOR) 10 MG tablet, Take 1 tablet by mouth Every Night., Disp: 90 tablet, Rfl: 3    escitalopram (Lexapro) 10 MG tablet, Take 1 tablet by mouth Daily., Disp: 90 tablet, Rfl: 3    glimepiride (AMARYL) 2 MG tablet, Take 1 tablet by mouth Every Morning Before Breakfast. Take if fasting blood sugar greater than 150., Disp: 30 tablet, Rfl: 2    SITagliptin (Januvia) 100 MG tablet, Take 1 tablet by mouth Daily., Disp: 90  "tablet, Rfl: 1    hydrOXYzine (ATARAX) 10 MG tablet, Take 1 to 2 tablets as needed for anxiety., Disp: 30 tablet, Rfl: 5    lisinopril (Zestril) 2.5 MG tablet, Take 1 tablet by mouth Daily., Disp: 90 tablet, Rfl: 3    Objective     Vitals:    05/07/25 0820   BP: 116/68   BP Location: Left arm   Patient Position: Sitting   Cuff Size: Large Adult   Pulse: 84   Resp: 18   Temp: 97.7 °F (36.5 °C)   TempSrc: Temporal   SpO2: 97%   Weight: 73.2 kg (161 lb 6.4 oz)   Height: 185.4 cm (72.99\")        Wt Readings from Last 3 Encounters:   05/07/25 73.2 kg (161 lb 6.4 oz)   03/28/25 74.2 kg (163 lb 9.6 oz)   02/03/25 75.5 kg (166 lb 8 oz)        BP Readings from Last 3 Encounters:   05/07/25 116/68   03/28/25 120/70   02/03/25 138/90        Physical Exam  Vitals reviewed.   Constitutional:       General: He is not in acute distress.  Neck:      Thyroid: No thyromegaly.   Cardiovascular:      Rate and Rhythm: Normal rate and regular rhythm.   Pulmonary:      Effort: Pulmonary effort is normal.      Breath sounds: Normal breath sounds. No wheezing, rhonchi or rales.   Musculoskeletal:      Right lower leg: No edema.      Left lower leg: No edema.   Lymphadenopathy:      Cervical: No cervical adenopathy.   Skin:     General: Skin is warm and dry.   Neurological:      General: No focal deficit present.      Mental Status: He is alert.   Psychiatric:         Thought Content: Thought content normal.                Result Review   The following data was reviewed by: SHANNON Hernandez on 05/07/2025:  [x]  Tests & Results  []  Hospitalization/Emergency Department/Urgent Care  []  Internal/External Consultant Notes       Assessment and Plan     Diagnoses and all orders for this visit:    1. Anxiety and depression (Primary)    2. Abnormal TSH  -     Thyroid Peroxidase Antibody; Future  -     TSH Rfx On Abnormal To Free T4; Future    3. Type 2 diabetes mellitus with hyperglycemia, without long-term current use of insulin  -     " Microalbumin / Creatinine Urine Ratio - Urine, Clean Catch; Future  -     Comprehensive Metabolic Panel; Future  -     Hemoglobin A1c; Future  -     Diabetic Supplies  -     Microalbumin / Creatinine Urine Ratio - Urine, Clean Catch  -     Microalbumin / Creatinine Urine Ratio - Urine, Clean Catch  -     Microalbumin / Creatinine Urine Ratio - Urine, Clean Catch    4. Hyperlipidemia, unspecified hyperlipidemia type    5. Screening for malignant neoplasm of prostate  -     PSA Screen    6. Annual physical exam  -     Hemoglobin A1c  -     TSH  -     T4, Free  -     Lipid Panel  -     CBC & Differential  -     Comprehensive Metabolic Panel    Other orders  -     hydrOXYzine (ATARAX) 10 MG tablet; Take 1 to 2 tablets as needed for anxiety.  Dispense: 30 tablet; Refill: 5  -     escitalopram (Lexapro) 10 MG tablet; Take 1 tablet by mouth Daily.  Dispense: 90 tablet; Refill: 3  -     SITagliptin (Januvia) 100 MG tablet; Take 1 tablet by mouth Daily.  Dispense: 90 tablet; Refill: 1  -     atorvastatin (LIPITOR) 10 MG tablet; Take 1 tablet by mouth Every Night.  Dispense: 90 tablet; Refill: 3  -     lisinopril (Zestril) 2.5 MG tablet; Take 1 tablet by mouth Daily.  Dispense: 90 tablet; Refill: 3  -     glimepiride (AMARYL) 2 MG tablet; Take 1 tablet by mouth Every Morning Before Breakfast. Take if fasting blood sugar greater than 150.  Dispense: 30 tablet; Refill: 2         Assessment & Plan  1. Anxiety.  - Reports that the current dose of Lexapro 10 mg has been helping, but still experiences anxiety on some days.  - Noted that taking Lexapro in the afternoon helps with tiredness experienced when taken in the morning.  - A prescription for hydroxyzine will be provided, to be taken as needed, particularly on days when sleep is difficult.  - A refill for Lexapro 10 mg will be sent in, with a 90-day supply. If symptoms persist, he may consider increasing the dose to 20 mg.    2. Elevated thyroid levels.  - Thyroid levels have  been fluctuating, with recent tests showing elevated levels.  - Physical exam reveals no swelling or nodules in the thyroid area.  - A comprehensive panel, including thyroid function tests, was conducted today. The results will be reviewed to determine if further action is needed.  - An additional test for Hashimoto's thyroiditis will be added to check for autoimmune thyroid issues.    3. Diabetes mellitus.  - Blood glucose levels have been inconsistent, with a recent reading around 200.  - Currently on Januvia and takes it first thing in the morning before eating.  - A urine sample will be collected today for diabetic screening.  - A prescription for glimepiride will be provided, to be taken as needed if blood glucose levels exceed 150. Advised to monitor fasting blood glucose levels daily.  - A prescription for lisinopril 2.5 mg will be provided for kidney protection.    4. Medication management.  - Currently taking atorvastatin 10 mg at night before bed.  - A refill for atorvastatin will be sent in.     BMI is within normal parameters. No other follow-up for BMI required.     Patient was given instructions and counseling regarding his condition or for health maintenance advice. Please see specific information pulled into the AVS if appropriate.     Follow Up   Return in about 6 months (around 11/7/2025) for Annual physical.    Patient or patient representative verbalized consent for the use of Ambient Listening during the visit with  SHANNON Hernandez for chart documentation. 5/7/2025  08:25 EDT    SHANNON Hernandez

## 2025-05-09 ENCOUNTER — RESULTS FOLLOW-UP (OUTPATIENT)
Dept: INTERNAL MEDICINE | Age: 60
End: 2025-05-09
Payer: COMMERCIAL

## 2025-05-09 DIAGNOSIS — E11.65 TYPE 2 DIABETES MELLITUS WITH HYPERGLYCEMIA, WITHOUT LONG-TERM CURRENT USE OF INSULIN: ICD-10-CM

## 2025-05-09 DIAGNOSIS — R79.89 ABNORMAL TSH: Primary | ICD-10-CM

## 2025-05-29 ENCOUNTER — OFFICE VISIT (OUTPATIENT)
Age: 60
End: 2025-05-29
Payer: COMMERCIAL

## 2025-05-29 VITALS
OXYGEN SATURATION: 96 % | SYSTOLIC BLOOD PRESSURE: 110 MMHG | HEART RATE: 82 BPM | BODY MASS INDEX: 21.98 KG/M2 | HEIGHT: 73 IN | WEIGHT: 165.8 LBS | DIASTOLIC BLOOD PRESSURE: 62 MMHG

## 2025-05-29 DIAGNOSIS — M77.9 TENDINITIS: Primary | ICD-10-CM

## 2025-05-29 DIAGNOSIS — M77.02 MEDIAL EPICONDYLITIS OF LEFT ELBOW: ICD-10-CM

## 2025-05-29 RX ORDER — KETOROLAC TROMETHAMINE 30 MG/ML
60 INJECTION, SOLUTION INTRAMUSCULAR; INTRAVENOUS ONCE
Status: COMPLETED | OUTPATIENT
Start: 2025-05-29 | End: 2025-05-29

## 2025-05-29 RX ORDER — DICLOFENAC SODIUM 75 MG/1
75 TABLET, DELAYED RELEASE ORAL 2 TIMES DAILY
Qty: 30 TABLET | Refills: 0 | Status: SHIPPED | OUTPATIENT
Start: 2025-05-29

## 2025-05-29 RX ADMIN — KETOROLAC TROMETHAMINE 60 MG: 30 INJECTION, SOLUTION INTRAMUSCULAR; INTRAVENOUS at 09:03

## 2025-05-29 NOTE — PROGRESS NOTES
Chief Complaint  Arm Pain (Pt had an accident 3 weeks ago. He now complains of left arm pain that has not improved. )    Subjective      Houston Witt is a 59 y.o. male who presents to [facility]     History of Present Illness  The patient presents for an acute visit he is complaining of left arm pain.  He has normal primary care patient of [facility].    He has been experiencing persistent pain in his left arm for the past 3 weeks, which he attributes to a possible twisting injury sustained while lifting objects at home. The pain is particularly severe when he attempts to lift. His wife, a massage therapist, has identified knots in the inside of his biceps, which she has been treating with a cupping system. This treatment initially provided relief, but the pain recurred the following day, specifically when he flexes his arm. He is right-handed and has been favoring his left arm due to the pain. He reports no history of kidney issues. He has not taken any analgesics for the pain and has not applied ice to the affected area. He has a known diagnosis of carpal tunnel syndrome in his thumb, which has previously caused him to switch arms while driving. However, he is currently unable to drive with his left arm due to the pain. 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 He does not engage in sports activities such as golf or tennis.    He is diabetic and takes glimepiride at night and Januvia in the morning. His blood sugar was 174, and it has not been below 140 in about 6 years. 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 His blood sugar has been over 200 a couple of times recently. He checks his blood sugar at home and it was over 200 about 4 or 5 weeks ago, but it has been consistently under 200 now. He does not drink soda and eats sweets occasionally. He  "has not had any recent blood work with taking the two medications together.      Review of Systems  Constitutional:  Negative for activity change, fatigue and fever.  HENT:  Negative for congestion, rhinorrhea and sore throat.    Eyes:  Negative for discharge and redness.  Respiratory:  Negative for cough. Negative for shortness of breath, wheezing and stridor.    Cardiovascular:  Negative for chest pain.  Gastrointestinal:  Negative for abdominal pain.  Genitourinary:  Negative for dysuria.  Musculoskeletal:  positive left arm pain  Skin:  Negative for rash and wound.  Neurological:  Negative for weakness and headaches.  Hematological:  Negative for adenopathy.    SOCIAL HISTORY  He is a recovered alcoholic with a history of 12 years of alcohol abuse.   GFR-100.4, creat 0.86        Objective   Vital Signs:   Vitals:    05/29/25 0827   BP: 110/62   Pulse: 82   SpO2: 96%   Weight: 75.2 kg (165 lb 12.8 oz)   Height: 185.4 cm (72.99\")     Body mass index is 21.88 kg/m².    Wt Readings from Last 3 Encounters:   05/29/25 75.2 kg (165 lb 12.8 oz)   05/07/25 73.2 kg (161 lb 6.4 oz)   03/28/25 74.2 kg (163 lb 9.6 oz)     BP Readings from Last 3 Encounters:   05/29/25 110/62   05/07/25 116/68   03/28/25 120/70       Health Maintenance   Topic Date Due    DIABETIC EYE EXAM  Never done    Hepatitis B (1 of 3 - 19+ 3-dose series) Never done    COVID-19 Vaccine (1 - 2024-25 season) Never done    DIABETIC FOOT EXAM  02/05/2025    Pneumococcal Vaccine 50+ (1 of 2 - PCV) 09/24/2025 (Originally 12/3/1985)    INFLUENZA VACCINE  03/29/2030 (Originally 7/1/2025)    ZOSTER VACCINE (1 of 2) 11/06/2030 (Originally 12/3/2016)    TDAP/TD VACCINES (1 - Tdap) 11/08/2030 (Originally 12/3/1985)    ANNUAL PHYSICAL  07/03/2025    HEMOGLOBIN A1C  11/07/2025    PROSTATE CANCER SCREENING  05/07/2026    LIPID PANEL  05/07/2026    URINE MICROALBUMIN-CREATININE RATIO (uACR)  05/07/2026    COLORECTAL CANCER SCREENING  08/07/2029    HEPATITIS C SCREENING " " Completed       Past Medical History:   Diagnosis Date    Alcoholism 1993    Anxiety 3/02/24    Arthritis     Bronchitis     Clotting disorder 2018    Colon polyp     Depression 3/25/25    Diabetes mellitus     Hemorrhoids     High arches 2019    Hyperlipidemia 10/03/2023    Neuropathy in diabetes 2019    Onychomycosis 2019    Recovering alcoholic in remission 10/03/2023    Rectal bleeding     Stress fracture 2019    Substance abuse 1993    Type 2 diabetes mellitus with hyperglycemia, without long-term current use of insulin 10/03/2023     Past Surgical History:   Procedure Laterality Date    APPENDECTOMY      COLONOSCOPY N/A 08/07/2024    Procedure: COLONOSCOPY WITH HEMORRHOID BANDING X2;  Surgeon: Mukul Jones MD;  Location: [facility] ENDOSCOPY;  Service: General;  Laterality: N/A;  HEMORRHOIDS, 1 HEMORRHOIDS BANDED    HEMORRHOIDECTOMY       Family History   Problem Relation Age of Onset    Colon cancer Mother     Cancer Mother     Arthritis Maternal Grandmother     Osteoporosis Maternal Grandmother     Alcohol abuse Maternal Uncle     Heart disease Paternal Uncle      Social History     Socioeconomic History    Marital status:    Tobacco Use    Smoking status: Never     Passive exposure: Never    Smokeless tobacco: Never   Vaping Use    Vaping status: Never Used   Substance and Sexual Activity    Alcohol use: Yes     Alcohol/week: 24.0 standard drinks of alcohol     Types: 24 Cans of beer per week     Comment: 6 days a week, every week    Drug use: Yes     Types: Amphetamines, Barbiturates, \"Crack\" cocaine, Cocaine(coke), LSD, Marijuana, Methamphetamines, PCP, Solvent inhalants    Sexual activity: Yes     Partners: Female     Comment: None,        Current Outpatient Medications   Medication Instructions    atorvastatin (LIPITOR) 10 mg, Oral, Nightly    diclofenac (VOLTAREN) 75 mg, Oral, 2 Times Daily, Take with food    escitalopram (LEXAPRO) 10 mg, Oral, Daily    glimepiride (AMARYL) 2 mg, Oral, " Every Morning Before Breakfast, Take if fasting blood sugar greater than 150.    hydrOXYzine (ATARAX) 10 MG tablet Take 1 to 2 tablets as needed for anxiety.    lisinopril (ZESTRIL) 2.5 mg, Oral, Daily    SITagliptin (JANUVIA) 100 mg, Oral, Daily       There are no discontinued medications.     Physical Exam  Vitals and nursing note reviewed.   Constitutional:       Appearance: Normal appearance.   HENT:      Head: Normocephalic.   Eyes:      Extraocular Movements: Extraocular movements intact.      Pupils: Pupils are equal, round, and reactive to light.   Cardiovascular:      Rate and Rhythm: Normal rate and regular rhythm.      Pulses: Normal pulses.   Pulmonary:      Effort: Pulmonary effort is normal.      Breath sounds: Normal breath sounds.   Abdominal:      Palpations: Abdomen is soft.   Musculoskeletal:         General: Normal range of motion.      Cervical back: Normal range of motion.      Comments: Exam is essentially normal however over the left medial aspect of the forearm he is quite tender along the tendon   Skin:     General: Skin is warm and dry.   Neurological:      General: No focal deficit present.      Mental Status: He is alert.   Psychiatric:         Mood and Affect: Mood normal.         Behavior: Behavior normal.         Thought Content: Thought content normal.          Physical Exam  Extremities: Pain is more medial in the left arm.  Musculoskeletal: Pain noted in the medial aspect of the left arm, exacerbated by lifting and bending.       Result Review :  The following data was reviewed by: SHANNON Wharton on 05/29/2025:    Labs  Office Visit on 05/07/2025   Component Date Value Ref Range Status    PSA 05/07/2025 0.237  0.000 - 4.000 ng/mL Final    Hemoglobin A1C 05/07/2025 8.40 (H)  4.80 - 5.60 % Final    TSH 05/07/2025 5.800 (H)  0.270 - 4.200 uIU/mL Final    Free T4 05/07/2025 1.22  0.92 - 1.68 ng/dL Final    Total Cholesterol 05/07/2025 108  0 - 200 mg/dL Final     Triglycerides 05/07/2025 44  0 - 150 mg/dL Final    HDL Cholesterol 05/07/2025 38 (L)  40 - 60 mg/dL Final    LDL Cholesterol  05/07/2025 59  0 - 100 mg/dL Final    VLDL Cholesterol 05/07/2025 11  5 - 40 mg/dL Final    LDL/HDL Ratio 05/07/2025 1.61   Final    Glucose 05/07/2025 176 (H)  65 - 99 mg/dL Final    BUN 05/07/2025 21 (H)  6 - 20 mg/dL Final    Creatinine 05/07/2025 0.86  0.76 - 1.27 mg/dL Final    Sodium 05/07/2025 136  136 - 145 mmol/L Final    Potassium 05/07/2025 4.5  3.5 - 5.2 mmol/L Final    Chloride 05/07/2025 98  98 - 107 mmol/L Final    CO2 05/07/2025 27.9  22.0 - 29.0 mmol/L Final    Calcium 05/07/2025 9.5  8.6 - 10.5 mg/dL Final    Total Protein 05/07/2025 7.0  6.0 - 8.5 g/dL Final    Albumin 05/07/2025 4.5  3.5 - 5.2 g/dL Final    ALT (SGPT) 05/07/2025 21  1 - 41 U/L Final    AST (SGOT) 05/07/2025 27  1 - 40 U/L Final    Alkaline Phosphatase 05/07/2025 61  39 - 117 U/L Final    Total Bilirubin 05/07/2025 0.4  0.0 - 1.2 mg/dL Final    Globulin 05/07/2025 2.5  gm/dL Final    A/G Ratio 05/07/2025 1.8  g/dL Final    BUN/Creatinine Ratio 05/07/2025 24.4  7.0 - 25.0 Final    Anion Gap 05/07/2025 10.1  5.0 - 15.0 mmol/L Final    eGFR 05/07/2025 100.4  >60.0 mL/min/1.73 Final    WBC 05/07/2025 5.67  3.40 - 10.80 10*3/mm3 Final    RBC 05/07/2025 4.67  4.14 - 5.80 10*6/mm3 Final    Hemoglobin 05/07/2025 14.7  13.0 - 17.7 g/dL Final    Hematocrit 05/07/2025 41.2  37.5 - 51.0 % Final    MCV 05/07/2025 88.2  79.0 - 97.0 fL Final    MCH 05/07/2025 31.5  26.6 - 33.0 pg Final    MCHC 05/07/2025 35.7  31.5 - 35.7 g/dL Final    RDW 05/07/2025 13.0  12.3 - 15.4 % Final    RDW-SD 05/07/2025 41.6  37.0 - 54.0 fl Final    MPV 05/07/2025 9.0  6.0 - 12.0 fL Final    Platelets 05/07/2025 256  140 - 450 10*3/mm3 Final    Neutrophil % 05/07/2025 69.3  42.7 - 76.0 % Final    Lymphocyte % 05/07/2025 19.9  19.6 - 45.3 % Final    Monocyte % 05/07/2025 7.8  5.0 - 12.0 % Final    Eosinophil % 05/07/2025 2.1  0.3 - 6.2 % Final     Basophil % 05/07/2025 0.5  0.0 - 1.5 % Final    Immature Grans % 05/07/2025 0.4  0.0 - 0.5 % Final    Neutrophils, Absolute 05/07/2025 3.93  1.70 - 7.00 10*3/mm3 Final    Lymphocytes, Absolute 05/07/2025 1.13  0.70 - 3.10 10*3/mm3 Final    Monocytes, Absolute 05/07/2025 0.44  0.10 - 0.90 10*3/mm3 Final    Eosinophils, Absolute 05/07/2025 0.12  0.00 - 0.40 10*3/mm3 Final    Basophils, Absolute 05/07/2025 0.03  0.00 - 0.20 10*3/mm3 Final    Immature Grans, Absolute 05/07/2025 0.02  0.00 - 0.05 10*3/mm3 Final    nRBC 05/07/2025 0.0  0.0 - 0.2 /100 WBC Final    Microalbumin/Creatinine Ratio 05/07/2025    Final    Unable to calculate    Creatinine, Urine 05/07/2025 116.0  mg/dL Final    Microalbumin, Urine 05/07/2025 <1.2  mg/dL Final       Imaging  No Images in the past 120 days found..    Results  Labs   - Blood sugar: 174 mg/dL   - A1c: 05/2025, 8.4%       Procedures       ASSESSMENT & PLAN  Diagnoses and all orders for this visit:    1. Tendinitis (Primary)  -     Ambulatory Referral to Physical Therapy for Evaluation & Treatment  -     ketorolac (TORADOL) injection 60 mg  -     diclofenac (VOLTAREN) 75 MG EC tablet; Take 1 tablet by mouth 2 (Two) Times a Day. Take with food  Dispense: 30 tablet; Refill: 0    2. Medial epicondylitis of left elbow         Assessment & Plan  1. Pain in the left arm.  - The patient's left arm pain has persisted for approximately 3 weeks following a lifting incident at home. The pain is localized to the medial aspect of the arm and worsens with lifting activities.    - Physical examination reveals tenderness in the medial aspect of the arm. The patient has full range of motion but experiences pain when lifting, suggesting a possible tendon or muscle injury.  - Given his diabetic status and elevated blood glucose levels, steroid treatment is not recommended. Instead, a non-steroidal anti-inflammatory drug (NSAID) will be prescribed. A referral for physical therapy will be made to  further evaluate and manage the condition.  - The use of a counterforce brace is recommended to provide support and alleviate discomfort. The patient is advised to rest the arm as much as possible and to apply heat for 20 minutes before physical therapy sessions and ice for 20 minutes post-therapy. A Toradol injection will be administered today for immediate relief. Diclofenac will be prescribed to be taken twice daily with meals. The patient is advised to avoid other NSAIDs but may use Tylenol as needed for additional pain management. If symptoms persist or worsen, further evaluation will be necessary.        PROCEDURE  Procedure: Toradol injection for left arm pain    All questions were answered and agreement to proceed was given after the following Pre-Procedure details were reviewed:  - Risks and Benefits: Discussed potential relief of pain and inflammation, possible side effects of the injection.  - Alternative Options: Physical therapy, oral anti-inflammatory medications.  - Side effects: Potential for localized pain at the injection site, possible allergic reaction.  - Consent: Verbal consent obtained.    Intra-Procedure:  - Time-Out: Confirmed patient identity, procedure, and site.  - Site Preparation: Cleaned the injection site with an antiseptic solution.  - Medication: Administered Toradol injection.  - Hemostasis: Applied pressure to the injection site to ensure no bleeding.  - Dressing: Applied a small adhesive bandage over the injection site.    Post-Procedure:  - Tolerance Level: Patient tolerated the procedure well.  - Complications: None observed.  - Home Care Instructions: Advised to monitor for any signs of infection or adverse reactions at the injection site, continue with prescribed oral anti-inflammatory medications, and follow up with physical therapy as recommended.        BMI is within normal parameters. No other follow-up for BMI required.           FOLLOW UP  Return if symptoms worsen or  fail to improve.  Patient was given instructions and counseling regarding his condition or for health maintenance advice. Please see specific information pulled into the AVS if appropriate.     Patient or patient representative verbalized consent for the use of Ambient Listening during the visit with  SHANNON Wharton for chart documentation. 5/29/2025  10:19 EDT    SHANNON Wharton  05/29/25  10:19 EDT

## 2025-05-29 NOTE — PATIENT INSTRUCTIONS
Wear counter force brace  See physical therapy   Take diclofenac twice a day with food avoid all other NSAIDs but may use Tylenol in between  Rest arm is much as possible  Use heat and ice as discussed  If symptoms are soreness pain persist or worsens has to be seen

## 2025-07-28 RX ORDER — GLIMEPIRIDE 2 MG/1
TABLET ORAL
Qty: 30 TABLET | Refills: 0 | Status: SHIPPED | OUTPATIENT
Start: 2025-07-28

## 2025-08-27 ENCOUNTER — OFFICE VISIT (OUTPATIENT)
Dept: INTERNAL MEDICINE | Age: 60
End: 2025-08-27
Payer: COMMERCIAL

## 2025-08-27 VITALS
OXYGEN SATURATION: 96 % | HEIGHT: 73 IN | WEIGHT: 164 LBS | HEART RATE: 69 BPM | TEMPERATURE: 98.4 F | DIASTOLIC BLOOD PRESSURE: 68 MMHG | BODY MASS INDEX: 21.74 KG/M2 | SYSTOLIC BLOOD PRESSURE: 116 MMHG | RESPIRATION RATE: 16 BRPM

## 2025-08-27 DIAGNOSIS — F41.9 ANXIETY: ICD-10-CM

## 2025-08-27 DIAGNOSIS — S60.552A: ICD-10-CM

## 2025-08-27 DIAGNOSIS — E78.5 HYPERLIPIDEMIA, UNSPECIFIED HYPERLIPIDEMIA TYPE: ICD-10-CM

## 2025-08-27 DIAGNOSIS — Z00.00 ANNUAL PHYSICAL EXAM: Primary | ICD-10-CM

## 2025-08-27 DIAGNOSIS — R79.89 ABNORMAL TSH: ICD-10-CM

## 2025-08-27 DIAGNOSIS — E11.65 TYPE 2 DIABETES MELLITUS WITH HYPERGLYCEMIA, WITHOUT LONG-TERM CURRENT USE OF INSULIN: ICD-10-CM

## 2025-08-27 DIAGNOSIS — Z12.5 SCREENING PSA (PROSTATE SPECIFIC ANTIGEN): ICD-10-CM

## 2025-08-27 RX ORDER — ESCITALOPRAM OXALATE 20 MG/1
20 TABLET ORAL DAILY
Qty: 90 TABLET | Refills: 3 | Status: SHIPPED | OUTPATIENT
Start: 2025-08-27

## (undated) DEVICE — SOLIDIFIER LIQLOC PLS 1500CC BT

## (undated) DEVICE — SOL IRRG H2O PL/BG 1000ML STRL

## (undated) DEVICE — DEV LIG SHORTSHOT HMROID M/BND W/TRIVIEW ANOSCP 5IN

## (undated) DEVICE — GLV SURG BIOGEL LTX PF 7 1/2

## (undated) DEVICE — SOL IRR NACL 0.9PCT BT 1000ML

## (undated) DEVICE — Device: Brand: DEFENDO AIR/WATER/SUCTION AND BIOPSY VALVE

## (undated) DEVICE — LINER SURG CANSTR SXN S/RIGD 1500CC

## (undated) DEVICE — CONN JET HYDRA H20 AUXILIARY DISP

## (undated) DEVICE — Device